# Patient Record
Sex: MALE | Race: WHITE | URBAN - METROPOLITAN AREA
[De-identification: names, ages, dates, MRNs, and addresses within clinical notes are randomized per-mention and may not be internally consistent; named-entity substitution may affect disease eponyms.]

---

## 2022-10-26 ENCOUNTER — HOSPITAL ENCOUNTER (OUTPATIENT)
Dept: BEHAVIORAL HEALTH | Facility: CLINIC | Age: 32
Discharge: HOME | End: 2022-10-26
Attending: FAMILY MEDICINE | Admitting: FAMILY MEDICINE
Payer: COMMERCIAL

## 2022-10-26 ENCOUNTER — BEH TREATMENT PLAN (OUTPATIENT)
Dept: BEHAVIORAL HEALTH | Facility: CLINIC | Age: 32
End: 2022-10-26
Attending: FAMILY MEDICINE

## 2022-10-26 PROCEDURE — 90791 PSYCH DIAGNOSTIC EVALUATION: CPT | Mod: GT,95

## 2022-10-26 NOTE — PROGRESS NOTES
"Anastacoi Middleton is a 32 year old male who is participating in an evaluation for problem gambling via a billable phone/video session.    Telemedicine Visit: The patient's condition can be safely assessed and treated via synchronous audio and visual telemedicine encounter.      Reason for Telemedicine Visit: Patient has requested telehealth visit    Originating Site (Patient Location): Patient's home    Distant Site (Provider Location): Provider Remote Setting- Home Office    Consent:  The patient/guardian has verbally consented to: the potential risks and benefits of telemedicine (video visit) versus in person care; bill my insurance or make self-payment for services provided; and responsibility for payment of non-covered services.     Mode of Communication:  Video Conference via [employer]    As the provider I attest to compliance with applicable laws and regulations related to telemedicine.    Phone visit start time:  12:30pm  Phone visit end time: 2:00pm    Length of session: 90 minutes    Counselor verified Patient with 2-point verification: Yes; Name  and     The patient has been notified of the following:      \"We have found that certain health care needs can be provided without the need for a face to face visit.  This service lets us provide the care you need with a phone conversation.       I will have full access to your [facility] medical record during this entire phone call.   I will be taking notes for your medical record.      Since this is like an office visit, we will bill your insurance company for this service.       There are potential benefits and risks of telephone visits (e.g. limits to patient confidentiality) that differ from in-person visits.?  Confidentiality still applies for telephone services, and nobody will record the visit.  It is important to be in a quiet, private space that is free of distractions (including cell phone or other devices) during the visit.??      If during the course of " "the call I believe a telephone visit is not appropriate, you will not be charged for this service\"     Consent has been obtained for this service by care team member: Yes     Informed of Duty to Warn?   Yes       Verbal consent for Release of Information:   Yes      Releases sent to patient through Docusign for signature?  Yes    Authorization For Electronic Communication  [place] - Self, Emergency Contact, DHS      Gambling Evaluation   Background Information     Date of Assessment:  10/26/2022   :  Jeni Schmitz [facility], [facility]   MRN:             [id number]   Patient Name:   Anastacio Middleton   YOB: 1990 Age:  32 year old Gender:  male   Preferred Name:  Anastacio   Pronouns:  He/him   Current Address:   [address] [place] [address]     Preferred phone #:  [phone]   May we leave a program related message?  Yes     Relationship Status   Ethnicity  White   Client's Primary Language:  English   E-mail address  [email or web address]   Referral Source:  [facility]     Emergency :  Lulu Kane   Emergency Contact Phone #:  [phone]     Do you have learning disabilities or require special accommodations?    No     What prompted you to come for a gambling assessment today?     Patient reported day trading stocks for two or more years. Patient reported lying to wife and hiding that he had been trading stocks. Patient reported \"unhealthy for my and my family\". Patient reported opening up to wife about day trading to wife about a week and a half ago and wife then called [facility] [place] and was referrals to [facility] for an assessment.      Have you been diagnosed with a gambling problem?    No     Have you been diagnosed with alcohol or drug related problems?    No           DIMENSION I - Acute Intoxication /Withdrawal Potential     Gambling History    Stage Age Games Played How Often Average Amt Bet Big Wins/Losses Consequences     Early   17 Stocks, day trading    12 x " week or more   $10,000-  $20,000 per week   $1,000 either wins or losses    at age 18, lost trust from wife. Lost money.     Middle     [age]   Stocks, day trading   10-20 trades per  week    $20,000-  $30,000 per week     $2,000- $3,000 either wins or losses   Again lost trust from wife, lost money. Beginning to be a problem.      Late     28-  Current   Stocks, day trading    12 x week or more   More than $30,000 per week   More than $3,000   Lying to wife. Mental wear and tear, lost money.      Last Bet:  About a month ago - trading stocks up to $250,000    Substance Use History             X = Primary Drug Used   Age of First Use Most Recent Pattern of Use and Duration   Need enough information to show pattern (both frequency and amounts) and to show tolerance for each chemical that has a diagnosis   Date of last use and time, if needed   Withdrawal Potential? Requiring special care Method of use  (oral, smoked, snort, IV, etc)      Alcohol     N/A             Marijuana/  Hashish   N/A           Cocaine/Crack     N/A           Meth/  Amphetamines   N/A           Heroin     N/A           Other Opiates/  Synthetics   N/A           Inhalants     N/A           Benzodiazepines     N/A           Hallucinogens     N/A           Barbiturates/  Sedatives/  Hypnotics N/A           Over-the-Counter Drugs   18 Tylenol, Advil - CU: 5 doses per week. Per patient does not completely take care of chronic hand pain. 10/25/2022  Oral      Other     N/A           Nicotine       12  Quit for 5 years  [facility]: 1 to 1 1/2-pack a day  [facility]: 1/2 pack a day 10/26/2022 Agitation  Smoked     Any current physical discomfort or withdrawal concerns?  Yes, please explain: chronic pain in left hand from injury in 2008 lost 2 fingers completely and 2 fingers partially.     Have you ever been to detox? No    How many times? NA    Have you had any of the following chemical dependency withdrawal symptoms?  Past 12 months Recent (past 30 days)    None None     Have you had any of the following gambling withdrawal symptoms?  Past 12 months Recent (past 30 days)   Sweating (Rapid Pulse)  Shaky / Jittery / Tremors  Unable to Sleep  Agitation  Headache  Fatigue / Extremely Tired  Sad / Depressed Feeling  Diminished Appetite  Anxiety / Worried Sweating (Rapid Pulse)  Shaky / Jittery / Tremors  Unable to Sleep  Agitation  Headache  Fatigue / Extremely Tired  Sad / Depressed Feeling  Diminished Appetite  Anxiety / Worried     Dimension I Ratings   Acute intoxication/Withdrawal potential - The placing authority must use the criteria in Dimension I to determine a client s acute intoxication and withdrawal potential.    RISK DESCRIPTIONS - Severity ratin Client displays full functioning with good ability to tolerate and cope with withdrawal discomfort. No signs or symptoms of intoxication or withdrawal or resolving signs or symptoms.    REASONS SEVERITY WAS ASSIGNED (What about the amount of the person s use and date of most recent use and history of withdrawal problems suggests the potential of withdrawal symptoms requiring professional assistance? )     Patient identifies gambling withdrawal symptoms, but reports ability to tolerate and cope with discomfort. Patient reports substance use with over the counter pain medication, reporting chronic pain in left hand from injury and smoking.            DIMENSION II - Biomedical Complications and Conditions     Do you have any current health/medical conditions?(Include any infectious diseases, allergies, or chronic or acute pain, history of chronic conditions)       Chronic pain since  - Left hand injury. Home accident  Not currently treated for pain only over the counter medication.     List current medication(s) including over-the-counter or herbal supplements--including pain management:     Tylenol and Advil (pain management) - five doses a week, most days but not every day.     Do you follow current medical  "recommendations/take medications as prescribed?     N/A    Do you have any dental problems?    No    Are you up to date on your medical, dental and eye appointments?     Yes    Are you or have you ever been prescribed: Abilify (Aripiprazole), Requip (ropinirole) Zelapar (selegiline hydrochloride), Comtan (entacapone) Mirapex (pramipexole)?     No    Do you have a health care provider?    The patient's PCP is Vicky Elizondo.    Do you need a referral to have a follow up with a primary care physician?    No.    Are you pregnant?     No    Do you have any concerns regarding your nutritional status?    No    Have you had any appetite changes in the last 3 months?    No    Have you had weight loss or weight gain of more than 10 lbs in the last 3 months?   If patient gained or lost more than 10 lbs, then refer to program RN / attending Physician for assessment.    No    Current height and weight?    6'1\" 205 lbs    Do you have any pain control problems?     Yes, and my current pain level on a 1 to 10 scale is a: 3. Tylenol and Advil do not take care of all the pain.     How is your pain managed?     Tylenol and Advil     Do you have any concerns/problems with short or long-term memory?     No    Dimension II Ratings   Biomedical Conditions and Complications - The placing authority must use the criteria in Dimension II to determine a client s biomedical conditions and complications.   RISK DESCRIPTIONS - Severity ratin Client tolerates and ga with physical discomfort and is able to get the services that the client needs.    REASONS SEVERITY WAS ASSIGNED (What physical/medical problems does this person have that would inhibit his or her ability to participate in treatment? What issues does he or she have that require assistance to address?)    Patient reports health conditions, reporting treatment from a primary care provider.  Patient denies biomedical conditions will not impact their ability " "to benefit form treatment. Patient reports their ability to navigate the health care system independently.             DIMENSION III - Emotional, Behavioral, Cognitive Conditions and Complications     The patient [place]      My childhood could be best described as:     \"Pretty normal, good childhood\"     Who raised you? (parents, grandparents, adoptive parents, step-parents, etc.)    Both Parents     Growing up, the patient was supported by:     Mom and brother     Siblings:     Two siblings brother and sister, patient is the youngest     Family Substance Use Disorder/Gambling/Mental Health history:     Extended family, uncle with gambling issue- Maternal, uncles with alcohol addictions - Maternal, another uncle drug addiction - Paternal. Grandfathers on both maternal and paternal sides with alcohol issues.        Have you ever been emotionally or verbally abused?            No    Have you ever emotionally or verbally abused someone else?        No    Have you ever been physically abused?            No    Have you ever physically abused someone else?            No    Have you ever intentionally hurt yourself by hitting, cutting or burning yourself?            No    Do you have any thoughts of harming anyone?            Yes, please explain: \"Passing thoughts, I think just with the gambling addiction passing thoughts of hurting wife, don't think I was close to doing those things, but I thought about it.\" patient reports no current thoughts.   Per follow up phone call with patient, patient reports thoughts as fleeting and having none since talking with her about his current gambling (stock trading). Patient reports no need for referral to speak to someone at this time.   Case consult with manager Mariela Durán on 10/27/2022. Advise patient to let us know if these thought occur again, but otherwise, no next steps needed.     Have you ever been sexually abused?            No    Have you ever sexually " "abused someone else?            No    Have you ever engaged in risky behavior that put you at risk for exposure to blood-borne or sexually transmitted diseases?    No    Have you ever been diagnosed with an eating disorder?          No    Have you ever been diagnosed with a clinical mental health disorder?            No    Have you ever been prescribed any medications for your mental health?            No    What medications are you currently taking?            None    Are you currently seeing a mental health therapist?            No    Have you ever had a suicide attempt?    No    Have you ever had any psychiatric hospitalizations?            No    Have you ever been in the ?    No    Highest grade of school completed:     College graduate - Bachelors Degree     Describe your preferred learning style:      by hands-on practice    Are you currently in school?            No    What are your greatest personal strengths?            Per patient \"I'm kind and care giving and a hard worker\".    What do you value most in life?            Per patient \"My alyssa in [employer]ab[employer] and family\".    GAIN Short Screener     1.)  When was the last time that you had significant problems...  A. with feeling very trapped, lonely, sad, blue, depressed or hopeless  about the future? Past Month    B. with sleep trouble, such as bad dreams, sleeping restlessly, or falling  asleep during the day? Past Month    C. with feeling very anxious, nervous, tense, scared, panicked, or like  something bad was going to happen? Past Month    D. with becoming very distressed and upset when something reminded  you of the past? 1+ years ago    E. with thinking about ending your life or committing suicide? Never    2.)  When was the last time that you did the following things two or more times?  A. Lied or conned to get things you wanted or to avoid having to do  something? Past Month    B. Had a hard time paying attention at school, work, or home? 2 - 12 " months ago    C. Had a hard time listening to instructions at school, work, or home? 2 - 12 months ago    D. Were a bully or threatened other people? 1+ years ago    E. Started physical fights with other people? 1+ years ago    Note: These questions are from the Global Appraisal of Individual Needs--Short Screener. Any item marked  past month  or  2 to 12 months ago  will be scored with a severity rating of at least 2.     For each item that has occurred in the past month or past year ask follow up questions to determine how often the person has felt this way or has the behavior occurred? How recently? How has it affected their daily living? And, whether they were using or in withdrawal at the time?    If the person has answered item 1E with  in the past year  or  the past month , ask about frequency and history of suicide in the family or someone close and whether they were under the influence.     NA    Has anyone close to you, a family member, a friend or a significant other attempted or completed a suicide?     No    If the person answered item 1E  in the past month  ask about intent, plan, means and access and any other follow-up information to determine imminent risk. Document any actions taken to intervene on any identified imminent risk.      NA    Dimension III Ratings   Emotional/Behavioral/Cognitive - The placing authority must use the criteria in Dimension III to determine a client s emotional, behavioral, and cognitive conditions and complications.   RISK DESCRIPTIONS - Severity ratin Client has impulse control and coping skills. Client presents a mild to moderate risk of harm to self or others or displays symptoms of emotional, behavioral or cognitive problems. Client has a mental health diagnosis and is stable. Client functions adequately in significant life areas.    REASONS SEVERITY WAS ASSIGNED - What current issues might with thinking, feelings or behavior pose barriers to participation in a  "treatment program? What coping skills or other assets does the person have to offset those issues? Are these problems that can be initially accommodated by a treatment provider? If not, what specialized skills or attributes must a provider have?    Patient denies a formal mental health diagnosis at this time and denies any mental health interventions in the past. Patient denies any current prescription for psychotropic medications at the time of assessment.  Patient's PHQ-9 was 6 out of 27, indicating mild depression.  Patient's LANA-7 score was 9 out of 21, indicating mild anxiety.  Patient denied any suicide attempts in the past.  Patient denied a history of trauma and/or abuse. Patient appears to lack impulse control and coping skills at this time.           DIMENSION IV - Readiness for Change     How has your gambling affected relationships in your life?     Per patient \"Strained and hurt relationship with wife\".    How has your gambling affected your finances?     Per patient \"Significantly hurt finances, loss of substantial amount of money\".    What values has gambling affected in your life?     Per patient \"Trustworthyness, openness with wife and general demoralizing to wife not coming to her for help and trying to fix a problem by myself\".    Has anyone expressed concern about your gambling?     Yes, please explain: Wife    What changes are you willing to make relative to your gambling?     Per patient \"I am willing to make a lot of changes, willing to give my wife control of all finances, not be involved with finances. Willing to stop gambling, willing to go to therapy or treatment to get help\".    How would you describe your current motivation to stop gambling?     Per patient \"Pretty motivatied\".      Dimension IV Ratings   Readiness for Change - The placing authority must use the criteria in Dimension IV to determine a client s readiness for change.   RISK DESCRIPTIONS - Severity ratin Client is " "cooperative, motivated, ready to change, admits problems, committed to change, and engaged in treatment as a responsible participant.    REASONS SEVERITY WAS ASSIGNED - (What information did the person provide that supports your assessment of his or her readiness to change? How aware is the person of problems caused by continued use? How willing is she or he to make changes? What does the person feel would be helpful? What has the person been able to do without help?)      Patient reports willingness to stop gambling, attend therapy and treatment. Patient describes being \"Pretty motivated\". Patient discussed awareness of gambling (stock trading) as a problem.        DIMENSION V - Relapse, Continued Use, and Continued Problem Potential     What triggers or situations increase your likelihood to steele?    Per patient \"Seeing big swings in the stock market and having time alone, hearing people talk about trading stocks, reading stuff on Internet and social media\".    What was your longest period of abstinence from gambling?    Four years - after Middle years age [age]    What did you do to stop gambling?    \"Talking with wife, making a plan that I would not do it. \"    What was your longest period of abstinence from alcohol/drugs?    Smoking - 6 years, both wife and patient quit together.     History of Gambling/Substance Use Disorder treatments  NA    If you had prior  or Gambling or Substance Use Disorder treatment, What was helpful?  What was not helpful?    NA    Please identify which self-help groups you have attended and how often you attended (Gamblers Anonymous, AA, NA, etc.)?    NA    What has helped you reduce your urges to steele?    \"Talking about the problem, not hiding it anymore.\"      Dimension V Ratings   Relapse/Continued Use/Continued problem potential - The placing authority must use the criteria in Dimension V to determine a client s relapse, continued use, and continued problem potential.   RISK " "DESCRIPTIONS - Severity ratin (A) Client has minimal recognition and understanding of relapse and recidivism issues and displays moderate vulnerability for further substance use or mental health problems. (B) Client has some coping skills inconsistently applied.    REASONS SEVERITY WAS ASSIGNED - (What information did the person provide that indicates his or her understanding of relapse issues? What about the person s experience indicates how prone he or she is to relapse? What coping skills does the person have that decrease relapse potential?)      Patient appears to understand history with gambling (stock trading) as an ongoing issue. Patient reports history of stock trading on and off since the age of 17 years old. With increase in amounts and time spent trading and thinking about trading.            DIMENSION VI - Recovery Environment   Are you currently working or in school? Please explain.     The patient reported he had been working full-time Remote two days per week, three days in office.      How has gambling affected your work?    N/A    How would you describe your current financial status?  Doing well \"not as good as we were\"    Are you are having problems with unpaid bills, bankruptcy, IRS problems, etc.?    No    What is your approximate present gambling debt?    \"Using money gambling out of savings\"    Describe a typical week for you i.e. work, leisure activities, socializing, etc.     Patient reports work 7am to 4pm M-F. 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 Hanging out with family, with friends once a week. Read books, watch movies, friends and family on Weekends.     Are you currently in a significant relationship?     Yes.  4B. How long? 13 years     Sexual Orientation:     Heterosexual    Who do you live with?      Wife and two kids two sons - 5 and 3 years old.     Do you have any children?      Yes, please explain: Two sons, 5 and 3 years old " "    How many times have you been ?    Once    Describe your current support system i.e. family, friends, sponsor, therapist, etc.?      Wife and brother.     Do you have any past or present legal charges?    Yes, please explain: past - legal charges  with accident to hand. These were dismissed.     Do you have any obstacles that would prevent you from participating in treatment?    In-patient would be tough but no issues with outpatient programs.     Do you pray, meditate, do yoga, attend AA/NA/GA or other spiritual practices?    Yes, please explain: Daily Prayer, attend [facility] a few times a month.     Do you participate in community alyssa activies such as attending [employer], temple, Anglican, or [employer]?      Yes    How does spirituality impact your recovery?    \"Was a major reason I started talking about it and admitting to the problem\"    Please list any other problems, issues or concerns that could affect your recovery if not addressed?      \"Maybe have processing to do from the accident I was in, and still dealing with the chronic pain\"    Dimension VI Ratings   Recovery environment - The placing authority must use the criteria in Dimension VI to determine a client s recovery environment.   RISK DESCRIPTIONS - Severity ratin Client has passive social  or family and significant other are not interested in the client's recovery. The client is engaged in structured meaningful activity.    REASONS SEVERITY WAS ASSIGNED - (What support does the person have for making changes? What structure/stability does the person have in his or her daily life that will increase the likelihood that changes can be sustained? What problems exist in the person s environment that will jeopardize getting/staying clean and sober?)     The patient reports living home at the time of assessment.  Patient reports living environment is supportive of recovery efforts.  Patient reports being  for 13 years. " "Patient reports brother and parents know about stock trading but do not think it's a problem. The patient appears to lack adequate support in the community through 12-step meetings or other recovery based interactions at this time and appears to lack additional supports familiar with recovery.  Patient is employed full time. Patient denies any current legal involvement at this time.                     Summary of Gambling Disorder Symptoms     Needs to steele with increasing amount of money in order to achieve desired excitement.  Is restlessness or irritable when attempting to cut down or stop gambling.  Has made repeated unsuccessful efforts to cut down or stop gambling.  Is often preoccupied with gambling (e.g. having persistent thoughts of reliving past gambling experiences, handicapping or planning the next venture, thinking of ways to get money with which to steele).  Often gambles when feeling distressed (e.g. feelings of helplessness, guilt, anxiety, depression).  After losing money gambling, individual often returned another day to get even. (\"chasing one's losses\")  Lies to conceal the extent of involvement with gambling.  Has jeopardized or lost a significant relationship, job, educational or career opportunity because of gambling.    Specify if:   Episodic:  Meeting diagnostic at more than one time point, with symptoms subsiding between periods of gambling disorder for at least several months.    Persistent:  Experiencing continuous symptoms, to meet diagnostic criteria for multiple years.    Specify if:   In early remission:  After full criteria for alcohol/drug use disorder were previously met, none of the criteria for alcohol/drug use disorder have been met for at least 3 months but for less than 12 months (with the exception that Criterion A4,  Craving or a strong desire or urge to use alcohol/drug  may be met).     In sustained remission:   After full criteria for alcohol use disorder were " previously met, none of the criteria for alcohol/drug use disorder have been met at any time during a period of 12 months or longer (with the exception that Criterion A4,  Craving or strong desire or urge to use alcohol/drug  may be met).   Specify if:   This additional specifier is used if the individual is in an environment where access to alcohol is restricted.    Mild: Presence of 4-5 symptoms    Moderate: Presence of 6-7 symptoms    Severe: Presence of 8 or more symptoms      Summary of Substance Abuse Disorder Symptoms     A problematic pattern of alcohol/drug use leading to clinically significant impairment or distress, as manifested by at least two of the following, occurring within a 12-month period:    NA    Specify if:   In early remission:  After full criteria for alcohol/drug use disorder were previously met, none of the criteria for alcohol/drug use disorder have been met for at least 3 months but for less than 12 months (with the exception that Criterion A4,  Craving or a strong desire or urge to use alcohol/drug  may be met).     In sustained remission:   After full criteria for alcohol use disorder were previously met, none of the criteria for alcohol/drug use disorder have been met at any time during a period of 12 months or longer (with the exception that Criterion A4,  Craving or strong desire or urge to use alcohol/drug  may be met).   Specify if:   This additional specifier is used if the individual is in an environment where access to alcohol is restricted.    Mild: Presence of 2-3 symptoms    Moderate: Presence of 4-5 symptoms    Severe: Presence of 6 or more symptoms    SOGS: 11 DSM-5: 8 CAGE-AID: 0 LANA-7: 9 PHQ-9: 6       Vulnerable Adult Checklist for OUTPATIENTS     1.  Do you have a physical, emotional or mental infirmity or dysfunction?       No    2.  Does this issue impair your ability to provide for your own care without help, including providing yourself with food, shelter,  clothing, healthcare or supervision?       No    3.  Because of this issue, I need assistance to protect myself from maltreatment by others.      No    Based on the above information:    This person is not a functional Vulnerable Adult according to [place] Statute 626.5572 subdivision 21.      Category Severity (ICD-10 Code / DSM 5 Code)   Gambling Disorder Severe  (F63.0) (312.31)     [place]-Suicide Severity Rating Scale   Suicide Ideation   1.) Have you ever wished you were dead or that you could go to sleep and not wake up?     Lifetime:  Yes Past Month:  Yes   2.) Have you actually had any thoughts of killing yourself?   Lifetime:  Yes Past Month:  Yes   3.) Have you been thinking about how you might do this?     Lifetime:  No Past Month:  No   4.) Have you had these thoughts and had some intention of acting on them?     Lifetime:  No Past Month:  No   5.) Have you started to work out the details of how to kill yourself?   Lifetime:  No Past Month:  No   6.) Do you intend to carry out this plan?      Lifetime:  No Past Month:  No   Intensity of Ideation   Intensity of ideation (1 being least severe, 5 being most severe):     Lifetime:  1 Past Month:  1   How often do you have these thoughts?  Once a week      When you have the thoughts how long do they last?  Fleeting - few seconds or minutes   Can you stop thinking about killing yourself or wanting to die if you want to?  Yes, can control thoughts with some difficulty   Are there things - anyone or anything (i.e. family, Zoroastrian, pain of death) that stopped you from wanting to die or acting on thoughts of suicide?  Protective factors definitely stopped you from attempting suicide   What sort of reasons did you have for thinking about wanting to die or killing yourself (ie end pain, stop how you were feeling, get [id number] or reaction, revenge)?  Completely to end or stop the pain (you couldn't go on living the way you were feeling)   Suicidal Behavior    (Suicide Attempt) - Have you made a suicide attempt?     Lifetime:  No Past Month:  No   Have you engaged in self-harm (non-suicidal self-injury)?  No   (Interrupted Attempt) - Has there been a time when you started to do something to end your life but someone or something stopped you before you actually did anything?  NA   (Aborted or Self-Interrupted Attempt) - Has there been a time when you started to do something to try to end your life but you stopped yourself before you actually did anything?  NA   (Preparatory Acts of Behavior) - Have you taken any steps towards making suicide attempt or preparing to kill yourself (such as collecting pills, getting a gun, giving valuables away or writing a suicide note)?  NA   Actual Lethality/Medical Damage:  NA     2008  [employer] [facility]  Used with permission by Letty Marques, PhD.       Guide to C-SSRS Risk Ratings   NO IDEATION:  with no active thoughts IDEATION: with a wish to die. IDEATION: with active thoughts. Risk Ratings   If Yes No No 0 - Very Low Risk   If NA Yes No 1 - Low Risk   If NA Yes Yes 2 - Low/moderate risk   IDEATION: associated thoughts of methods without intent or plan INTENT: Intent to follow through on suicide PLAN: Plan to follow through on suicide Risk Ratings cont...   If Yes No No 3 - Moderate Risk   If Yes Yes No 4 - High Risk   If Yes Yes Yes 5 - High Risk   The patient's ADDITIONAL RISK FACTORS and lack of PROTECTIVE FACTORS may increase their overall suicide risk ratings.     Additional Risk Factors:    Significant history of untreated or poorly treated chronic pain issues     A recent death of someone close to the patient and/or unresolved grief and loss issues   Protective Factors:    Having people in his/her life that would prevent the patient from considering a suicide attempt (i.e. young children, spouse, parents, etc.)     A positive relationship with his/her clinical medical and/or mental health  "providers     Having easy access to supportive family members     Having a good community support network     Having cultural, Pentecostal or spiritual beliefs that discourage suicide     Risk Status   2. - Low/moderate risk: Collaborate with patient / client to develop \"Patient Safety Plan\"   Additional information to support suicide risk rating: There was no additional information to provide at this time.     Summary of [facility] Placement Criteria:   I.) Intoxication and Withdrawal: 0   II.) Biomedical:  1   III.) Emotional and Behavioral:  1   IV.) Readiness to Change:  0   V.) Relapse Potential: 2   VI.) Recovery Environmental: 1     Evaluation Summary and Plan   's Recommendation    Abstain from all forms of gambling, including \"free\" games , and online/héctor games.  Abstain from all mood-altering chemicals unless prescribed by a licensed provider.  Complete the evening outpatient compulsive gambling treatment program at [facility].   Complete Orientation and begin group on 10/27/2022.  Attend GA 12-step, cultural, spiritual, other supportive community meeting on a weekly basis.   Have someone you check in with weekly who will hold you accountable.   Remain law abiding and follow all recommendations of the courts/PO.  Limit, if not abstain from alcohol and all other non-prescribed mood altering chemicals    At the beginning of the assessment patient was offered information on inpatient treatment, other outpatient options, individual counselors, and Gamblers Anonymous.  Patient chose to attend [facility] as it worked for their schedule and has a family component.   has a financial interest in patient attending the [facility] program.  's clinical opinion is that the patient would benefit from group therapy.      Initial problem list:    The patient lacks relapse prevention skills  The patient has poor coping skills  The patient lacks a sober peer support network    Strengths:  Family " support  Stable Employment  Financially secure  Properly treated mental health concerns  Properly treated physical health concerns  Stable housing  Connection to social and/or recovery support  Intelligent  Functional communication skills    Writer called patient and recommended outpatient counseling with [facility]. Groups Tuesdays and Wednesdays 5:30pm-7:30pm and individual sessions as needed. Patient accepted recommendation and will be starting group on 10/27/2022 with orientation at 2:00pm also on 10/27/2022.     Jeni BRIZUELA, CGC

## 2022-10-27 ENCOUNTER — HOSPITAL ENCOUNTER (OUTPATIENT)
Dept: BEHAVIORAL HEALTH | Facility: CLINIC | Age: 32
Discharge: HOME | End: 2022-10-27
Attending: FAMILY MEDICINE
Payer: COMMERCIAL

## 2022-10-27 ENCOUNTER — TELEPHONE (OUTPATIENT)
Dept: BEHAVIORAL HEALTH | Facility: CLINIC | Age: 32
End: 2022-10-27

## 2022-10-27 PROCEDURE — 90832 PSYTX W PT 30 MINUTES: CPT | Mod: GT,95

## 2022-10-27 PROCEDURE — 90853 GROUP PSYCHOTHERAPY: CPT | Mod: GT,95

## 2022-10-27 NOTE — ADDENDUM NOTE
Encounter addended by: Jeni Schmitz [facility] on: 10/27/2022 1:32 PM   Actions taken: Clinical Note Signed

## 2022-10-27 NOTE — PROGRESS NOTES
"Adult Outpatient Mental Health Programs    MY COPING PLAN FOR SAFETY    NAME: Anastacio Middleton  MRN: [id number]  SAFETY PLAN:  Step 1: Warning signs / cues (Thoughts, images, mood, situation, behavior) that a crisis may be developing:    Thoughts: \"I'm a burden\"    Images: obsessive thoughts of death or dying: \    Thinking Processes: ruminations (can't stop thinking about my problems): .    Mood: intense worry and agitation    Behaviors: isolating/withdrawing , not taking care of myself and increasing frequency and duration of dissociation    Situations: public shame: . and relationship problems   Step 2: Coping strategies - Things I can do to take my mind off of my problems without contacting another person (relaxation technique, physical activity):    Distress Tolerance Strategies:  relaxation activities: ., listen to positive and upbeat music: ., watch a funny movie: ., read a book: ., paced breathing/progressive muscle relaxation and intense exercise: . for 2-3 minutes     Physical Activities: go for a walk, exercise: ., yoga, deep breathing and stretching     Focus on helpful thoughts:  \"I will get through this\", think about happy memories: family vacations, [facility]. Family cuddle time. Family holiday times. , remind myself of what is important to me: alyssa, relationships with my wife and kids. and self-compassion statements: you are a good spouse and father, you are a beliver and have alyssa, you are a good human being.   Step 3: People and social settings that provide distraction:   Name: Lulu  Phone: [phone]   Name: Brother Khari Slaughter Phone: [phone]   Name: Cousin Khari Guardado  Phone: [phone]    movie theater, coffee shop, park and [employer]   Step 4: Remind myself of people and things that are important to me and worth living for:  Wife and kids, immediate family, friends   Step 5: When I am in crisis, I can ask these people to help me use my safety plan:   Name: Lulu Phone: [phone]   Name: " Brother Khari Slaughter Phone: [phone]   Name: Irina Chandra Phone: [phone]  Step 6: Making the environment safe:     remove access to firearms: ., remove things I could use to hurt myself: . and be around others  Step 7: Professionals or agencies I can contact during a crisis:    Suicide Prevention Lifeline: 5-614-217-TALK (0634)    Crisis Text Line Service (available 24 hours a day, 7 days a week): Text [place] to 483339    Call  **CRISIS (069409) from a cell phone to talk to a team of professionals who can help you.  Crisis Services By [facility]: Phone Number:   [place]     [phone]   [place]    [phone]   [place]    [phone]   [place]    [phone]   [place]    [phone]   [place] [phone]   [place]     [phone]       Call 911 or go to my nearest emergency department.     I helped develop this safety plan and agree to use it when needed.  I have been given a copy of this plan.      Client signature _________________________________________________________________  Today s date:  10/27/2022  Adapted from Safety Plan Template 2008 Silvia Medina and Fortunato Carrion is reprinted with the express permission of the authors.  No portion of the Safety Plan Template may be reproduced without the express, written permission.  You can contact the authors at [email or web address] or [email or web address].    Email to patient with copy of safety plan    Jeni BRIZUELA, [facility]

## 2022-10-27 NOTE — TELEPHONE ENCOUNTER
Emailed DocuSign forms to patient to sign, date and time.   Release of information forms for DHS, Self, Emergency Contact and consent for electronic communication.

## 2022-10-27 NOTE — PROGRESS NOTES
"Individual counseling session:    Telemedicine Visit: The patient's condition can be safely assessed and treated via synchronous audio and visual telemedicine encounter.      Reason for Telemedicine Visit: Patient has requested telehealth visit    Originating Site (Patient Location): Patient's place of employment [address] [place] [address]    Distant Site (Provider Location): Provider Remote Setting- Home Office    Consent:  The patient/guardian has verbally consented to: the potential risks and benefits of telemedicine (video visit) versus in person care; bill my insurance or make self-payment for services provided; and responsibility for payment of non-covered services.     Mode of Communication:  Video Conference via [employer]    As the provider I attest to compliance with applicable laws and regulations related to telemedicine.    Counselor verified Patient with 2-point verification: Patient is known to provider.    Video visit start time: 2:00pm  Video visit end time: 2:35pm    Length of session: 35 minutes    D: Writer and patient met for scheduled individual session for Orientation to program and to set up Safety Plan. Patient was virtual from work at above address. Writer went over orientation information and paperwork with patient. Writer and patient put together Safety Plan. Patient asked questions about having face to face individual sessions. Writer informed patient this is an option. Writer also let patient know we have a hybrid group for in-person and virtual patients on the first Thursday of the month.     Patient identified goals as:   Rebuilding family relationships    Learning coping skills so not to relapse    Feeling better emotionally from what s happened, feeling more like myself\"    I: Counselor facilitated 1:1 session.  A: Patient appeared motivated to start treatment.   P: Writer to email copy of Safety plan to patient and welcome email. Patient to attend first group session " tonight at 5:30pm via Zoom.       Jeni BRIZUELA, CGC

## 2022-10-27 NOTE — ADDENDUM NOTE
Encounter addended by: Jeni Schmitz [facility] on: 10/27/2022 4:08 PM   Actions taken: Clinical Note Signed

## 2022-10-28 NOTE — PROGRESS NOTES
Group Therapy Documentation     Was the patient seen in-person or via Telemedicine (if in-person skip to Group Note): Telemedicine    If Telemedicine: The patient's condition can be safely assessed and treated via synchronous audio and visual telemedicine encounter. ? ?      Reason for Telemedicine Visit: Patient has requested telehealth visit    Originating Site (Patient Location): Patient's home    Distant Site (Provider Location): Provider Remote Setting- Home Office    Consent: ?The patient/guardian has verbally consented to: the potential risks and benefits of telemedicine (video visit) versus in person care; bill my insurance or make self-payment for services provided; and responsibility for payment of non-covered services.       Mode of Communication:??Video Conference via Zoom      As the provider I attest to compliance with applicable laws and regulations related to telemedicine.        Patient attended a video group session on the following days: ?          GROUP NOTE:  DATE OF SERVICE:  October 27, 2022    START TIME:  5:30pm    END TIME:  7:16pm    Group Length:   106 minutes    FACILITATOR(S):    Jeni BRIZUELA CGC    TOPIC: BEH Group Therapy, Problem Gambling Group     Number of patients attending the group: 4    Group Therapy Type:  Problem Gambling Group    Group Attendance:  Client attended group     Summary of Group / Topics Discussed:   Group began with the introduction of a new group member. Each group member took turns with their introduction to new member. The rest of the group session was an open conversation about recovery and treatment. Each group member added to discussion and had questions for other members. Topics discussed were: mental health therapy, Gamban, [employer], honesty, getting started in recovery, GA and others.      Patient's response to the group topic/interactions:    Patient appeared to connect well with others in the group.       Client specific details:     Patient was  introduced to group as this was his first group session. Patient asked questions of others and shared information about his current and past struggle with addiction.       Jeni Schmitz LADC, [facility]

## 2022-11-01 ENCOUNTER — HOSPITAL ENCOUNTER (OUTPATIENT)
Dept: BEHAVIORAL HEALTH | Facility: CLINIC | Age: 32
Discharge: HOME | End: 2022-11-01
Attending: FAMILY MEDICINE
Payer: COMMERCIAL

## 2022-11-01 PROCEDURE — 90853 GROUP PSYCHOTHERAPY: CPT | Mod: GT,95

## 2022-11-02 ENCOUNTER — TELEPHONE (OUTPATIENT)
Dept: BEHAVIORAL HEALTH | Facility: CLINIC | Age: 32
End: 2022-11-02

## 2022-11-02 NOTE — PROGRESS NOTES
"Group Therapy Documentation     Was the patient seen in-person or via Telemedicine (if in-person skip to Group Note): Telemedicine    If Telemedicine: The patient's condition can be safely assessed and treated via synchronous audio and visual telemedicine encounter. ? ?      Reason for Telemedicine Visit: Patient has requested telehealth visit    Originating Site (Patient Location): Patient's home    Distant Site (Provider Location): Provider Remote Setting- Home Office    Consent: ?The patient/guardian has verbally consented to: the potential risks and benefits of telemedicine (video visit) versus in person care; bill my insurance or make self-payment for services provided; and responsibility for payment of non-covered services.       Mode of Communication:??Video Conference via Zoom      As the provider I attest to compliance with applicable laws and regulations related to telemedicine.        Patient attended a video group session on the following days: ?          GROUP NOTE:  DATE OF SERVICE:  November 1, 2022    START TIME:  5:30pm    END TIME:  7:20 PM    Group Length:   110 minutes    FACILITATOR(S):    CHATA Perkins, [facility]    TOPIC: BEH Group Therapy, Problem Gambling Group     Number of patients attending the group: 6    Group Therapy Type:  Problem Gambling Group    Group Attendance:  Client attended group     Summary of Group / Topics Discussed: New group member was introduced and group members participated in introductions welcoming new group member.  Group members checked in sharing events from the week as well as a feeling.  Group discussion was held on various distorted thinking styles. Counselor shared diagram of \"The addiction cycle\".  Group members participated, sharing their personal experience with distorted thinking.  Group discussed various ways to challenge distorted thinking patterns to develop new patterns in recovery.     Patient's response to the group topic/interactions:  " PT's appeared to gain insight into their own distorted thinking patterns.      Client specific details:  PT shared feeling some overwhelming feelings navigating aspects of new recovery and other obligations.  PT actively engaged and shared in group discussion sharing group as a positive experience.    Clotilde Fox, CHATA, CGC

## 2022-11-03 ENCOUNTER — HOSPITAL ENCOUNTER (OUTPATIENT)
Dept: BEHAVIORAL HEALTH | Facility: CLINIC | Age: 32
Discharge: HOME | End: 2022-11-03
Attending: FAMILY MEDICINE
Payer: COMMERCIAL

## 2022-11-03 PROCEDURE — 90832 PSYTX W PT 30 MINUTES: CPT

## 2022-11-03 PROCEDURE — 90853 GROUP PSYCHOTHERAPY: CPT | Mod: GT,95

## 2022-11-04 ENCOUNTER — TELEPHONE (OUTPATIENT)
Dept: BEHAVIORAL HEALTH | Facility: CLINIC | Age: 32
End: 2022-11-04

## 2022-11-04 NOTE — ADDENDUM NOTE
Encounter addended by: Jeni Schmitz [facility] on: 11/4/2022 9:46 AM   Actions taken: Clinical Note Signed

## 2022-11-04 NOTE — PROGRESS NOTES
[facility]  Adult Gambling Program  Treatment Plan Requirements    These services are provided by the facility for each patient/client according to the individual's treatment plan:    Individual and group counseling    Education    Transition services    Services to address any co-occurring mental illness    Service coordination    Initial Treatment Plan Goals if noted in plan:  1. Complete all the requirements of Program Orientation.  2. Maintain medication compliance throughout the program.  3. Complete gambling therapy for identified issues on your problem list.  4. Gain family involvement in treatment process to address family issues from the problem list.  5. Attend and participate in all required group(s) per individual treatment plan.  6. Focus attention to individualized issues from the treatment plan.  7. Schedule a physical examination if recommended.    In addition to the above, complete all individual goals as specifically outlines on your treatment plan.    Criteria for discharge:  Patients/clients are discharged from the program following completion of the entire program including Phase I and II or acceptance of other post-treatment referrals to mental health providers, or aftercare at other facilities.  Patients/clients may also be discharged for inappropriate behavior, chemical use or gambling.      Favorable Discharge - Patients/clients have completed agreed upon treatment goals, understand their diagnosis and appear motivated about the follow-up care.    Guarded Discharge - Patients/clients have demonstrated some understanding of their diagnosis and recovery process and have completed some of their treatment goals.  This prognosis also includes patients/clients who have completed some treatment goals but have not made commitment to community support or follow through with referrals.    Unfavorable Discharge - Patients/clients have not completed agreed upon treatment  "goals due to their own choice, have limited understanding of their diagnosis, and have shown minimal or inconsistent behavior conducive to recovery.  Those patients/clients discharged due to behavioral problems will also be unfavorable discharges.                                  Adult Gambling Treatment Plan     Anastacio Middleton       [id number]  1990       32 year old       male                                   Treatment Plan Goals     PT identified treatment goals as:    1.\"Rebuilding family relationships\"  2.\"Learning coping skills so not to relapse\"  3.\"Feeling better emotionally from what's happened, feeling more like myself\"    Acute Intoxication/Withdrawal Potential      DIMENSION 1  RISK FACTOR: 0       Date  Source  Prob/Goal/  Intervention  Target  Date  Initials  Outcome  Completion  Date    November 3, 2022     Self - Current,   and Assessment - Current   Problem:   -PT reports last gambling behavior in Sept 2022.      Goal:   -Limit, if not abstain, from alcohol, and abstain from all other mood altering chemicals due to history of addiction and/or concern of cross addiction.    -Remain gambling free.  \"Been alcohol free entire life, abstain from other mood altering chemicals and stop smoking \"    Intervention:   -Report to counselor any concern or abuse with alcohol, drug use, or gambling during treatment.     -PT denies any gambling behaviors                                     Ongoing        Ongoing         AD /  JNM                                                                     Eff                                       04/12/2023     Biomedical Conditions and Complaints      DIMENSION 2  RISK FACTOR:   1       Date Source  Prob/Goal/  Intervention  Target  Date  Initials  Outcome  Completion  Date   November 3, 2022 Self - Current, and Assessment - Current  Problem:   -Patient reports a medical issue of chronic pain from hand injury in 2008    Goal:  -Follow recommendations of medical " "provider.  \" Follow doctor recommendations \"    Intervention:  -Keep and attend scheduled medical appointments and follow recommendations  .  -Take medications as prescribed      Yearly Physical                      Ongoing      As prescribed    Refered AD /  JNM                           None at this time.     November 3, 2022 Self - Current, and Assessment - Current   Problem:   -Poor exercise, diet, and sleep habits.     Goal:   -Improve physical and psychological health.   \"Get into a consistent exercise routine and michelle a mental health therapist \"    Interventions:   -Start an exercise program with regards to your ability, eat 3 nutritional meals daily and get 6-8 hours of sleep nightly.     -Gain an updated physical    Group:  Educational lecture/group discussion on Post Acute Withdrawal (PAW).    Video:  Sleep Hygiene                        Ongoing        Ongoing      In group        In group    AD /  JNM                     Eff        See above    Eff        Eff                     04/12/2023 11/17/2022 01/31/2023 11/17/2022     Emotional/Behavioral/Cognitive Conditions and Complications     DIMENSION 3  RISK FACTOR: 1            Date Source Prob/Goal/  Intervention Target  Date Initials Outcome Completion  Date   November 3, 2022 Self - Current, and Assessment - Current   Problem:   -Patient lacks impulse control and understanding of the effects of compulsive gambling on emotions and thinking patterns.    Goal:   -To understand the effects of compulsive gambling on your emotions.  -Learn to self soothe and calm any racing thoughts.  -Strengthen rational thought patterns.  \" Develop coping mechanisms and learn meditation and other body calming techniques \"    Intervention:  -Utilize emotions list, and begin journaling daily and share progress with counseling staff.    Group Discussion:  Discuss emotional effects of fears that impact recovery and how self-sabotaging behaviors impact " "recovery.    Group Discussion:  Levels of anger and ways of resolution     Grief and Loss          Group Discussion:      Taking Daily Inventory    Coping Skills:  99 Coping Skills     Discussion:  A Guide to Managing Stress    Video  Monkey Mind - Anxiety, Anger, Depression explained     Discussion:  Different emotion lists and Feelings Wheel    Behavior Model/Beliefs/Belief Window    Suicide - Ways to get help and rates with compulsive gambling.     Video:  \"Happiness is all in your mind\"                                   Ongoing          In group          In group    In group                  In group      In group      In group      In group       In group     In group     In group     In group     In group  AD /  SHANTELL                                                                     Eff          Eff    Eff                    Eff    Eff      Eff      Eff      Eff    Eff    Eff    Eff    Eff                                             11/8/2022          02/02/2023    02/07/2023                    11/01/2022    11/15/2022      12/15/2022      12/20/2022      12/05/2023    12/05/2023    01/24/2023    03/21/2023    01/19/2023   November 3, 2022 Self - Current, and Assessment - Current   Problem:   -Patient's self-esteem has been negatively impacted due to gambling and subsequent consequences.    Goal:   -To clarify self-concept.  Recognize how this has affected behaviors and how it can affect recovery and build self-esteem and self-worth.  \" To become more connected with self, learn to accept what I have done \"    Intervention:  Assignment:  -Explore the Real you, Ch. 21,  Who are you Ch. 22                                     In group                 CASSIE /  SHANTELL                                                         Eff                               01/26/2023            November 3, 2022 Self - Current Assessment - Current   Problem:    -Patient reports no mental health diagnosis at this time     Goal:  -Continue " "to stabilize mental health.   -Develop awareness of how mental health issues affect gambling and vice versa.    -Make preparations for individual therapy  \" Get a mental health therapist and build better mental habits \"  Intervention:     -Complete safety plan    -Update safety plan as needed    -Gain a mental health therapist in the community for ongoing support.                                Intake    As needed    Ongoing       AD /  JNM                                             Eff        Eff   11/03/2022                        10/27/2022        04/11/2023   November 3, 2022 Self - Current, and Assessment - Current   Problem:   -Patient struggles to have open and clear communication with others.    Goal:    -Increase understanding of clear communication styles.  \" Work to improve personal communication with family to be open and more vulnerable, go to wife when help is needed.\"                                              AD /  JNM                             Readiness to Change     DIMENSION 4  RISK FACTOR:   0            Date Source Prob/Goal/  Intervention Target  Date Initials Outcome Completion  Date   November 3, 2022 Self - Current, Assessment - Current   Problem:   -Patient diagnosed with Disordered Gambling (DSM-V).  PT has a combination of both internal and external motivation.    Goal:    -To refrain from gambling, increase knowledge of problem gambling, and to gain skills to help remain gambling free.  \" Abstain from gambling, learn techniques to avoid situations of gambling. \"     Intervention:  -24 Sessions of Phase I group (T/W/TH: 5:30 - 7:30pm)  -20 Sessions of Phase II group (T/TH: 5:30 - 7:30pm)  -Additional counseling sessions as deemed beneficial  -Phase III-1 night per week of group      Assignment:    Group Discussion:  Progression of Change and Stages of Change       Weekly:   Weekly verbal check in                             Phase I    Phase II    Ongoing    Phase III            In " "group           Weekly     AD /  MARENM                                             Eff    Eff    Eff    Eff            Eff          Eff                           01/24/2023    03/07/2023    04/12/2023    04/11/2023            12/01/2022          04/11/2023   November 3, 2022 Self - Current, and Assessment - Current   Problem:   -Patient lack of understanding the illness of compulsive gambling and life areas impacted.     Goal:   -To understand and gain awareness of the disease of compulsive gambling as a chronic, progressive incurable (but treatable) disease and to examine all areas affected.   -Increase motivation  \" Learn more about gambling and ways it affects you mind and others around you \"    Intervention:  Handout:  -Gambling and the Brain       Worksheet \"Get Motivated\"    Video: How I Tricked my Brain to Like Doing Hard Things (Dopamine Detox)    S.M.A.R.T Goals    Busyness                                   Orientation Folder    In group    In group       In group     In group  AD /  MARENM                                                         Eff    Eff      Eff    Eff                                         02/14/2023    02/14/2023      02/16/2023    03/02/2023            November 3, 2022 Self - Current, and Assessment - Current   Problem:    -Patient is disconnected from their Humanity, values, and virtues.    Goal:    -Decrease negative self-talk, increase sense of purpose in life and their value to society.  \" Improve self esteem and examine life goals and family goals\"    Intervention:    Group Discussion:  -Morals, values and ethics.  Practicing values in recovery.      Group Discussion:  -Practicing gratitude    Self Compassion     Group:  -Positive reading/Mindfulness reading    Character Strengths  Worksheet Strength and Qualities                             In group          In group    In group      Weekly    In group   In group  AD /  JNM                   " "                            Eff          Eff    Eff      Eff    Eff  Eff                             01/10/2023          12/27/2022    12/06/2022      04/11/2023    02/21/2023  02/23/2023       Relapse/Continued Use/Continued Problem Potential     DIMENSION 5  RISK FACTOR:   2               Date Source Prob/Goal/  Intervention Target  Date Initials Outcome Completion  Date   November 3, 2022 Self - Current, and Assessment - Current   Problem:    -Patient has poor relationship with money, large amounts of money gambled, and financial stressors because of gambling.    Goal:   -Remove all access to money and involvement in financial matters, protect assets.  reduce financial stress and gain insight into money relationship  \" Put in financial controls and give money control to my spouse and avoid making any financial decisions by myself \"    Intervention:  -Set up financial safeguards, such as a trusted person, bank, and/or other financial institution such as [employer].    Assignment:    -Chapter 16:  Emotional Meaning of Money    -Chapter 17:  Money and Finances    Checking your Credit Report - Difference between credit reports and credit score.          Gain a financial counselor and set up household budget                                 Ongoing            In group    In group    In group            Ongoing AD /  JNM                                                             Eff    Eff    Eff            Eff                                               01/17/2023    01/19/2023  04/11/2023  03/07/2023            02/14/2023   November 3, 2022 Self - Current, and Assessment - Current   Problem:    -Patient lacks an understanding of relapse triggers and cues and personal relapse process    Goal:    -Gain an understanding of events, places, and other variables can trigger a relapse.  \" Learn about my personal triggers and potential relapse signs and make a plan of what to do when they arise.  \"    Intervention:  -Ban self " "from [employer] establishments - omit from mailing lists.        Assignment:  -Trigger Inventory    -Early Warning Signs of Relapse      Group Discussion:  -Cross addiction, feelings in early recovery and urges to drink/steele or other impulsive behaviors.    Group Discussion:    Diagram of \"The addiction cycle\".       Stressors and stress management     Information and discussion on Gamblers Anonymous and SMART Recovery    Video:  \"Post Secrets\"   The Addiction Machine                               Ongoing            In group    In group        In group            In group      In group    In group        In group   In group  AD /  JNM                                                     Eff    Eff        Eff            Eff      Eff    Eff        Eff  Eff                                               11/29/2022    12/29/2022  01/03/2023      03/21/2023            11/01/2022      12/27/2022    03/14/2023        01/10/2023  02/23/2023         Recovery Environment     DIMENSION 6  RISK FACTOR:    1                Date Source Prob/Goal/  Intervention Target  Date Initials Outcome Completion  Date   November 3, 2022 Self - Current, and Assessment - Current   Problem:   -Patient lacks a sense of purpose and finding balance in life.    Goal:   -Develop a sense of purpose in life, and finding purpose, meaning and balance in daily events  \"Define purpose and goals in life and make steps to achieving those goals. \"    Intervention:  Maintain active employment    Assignment:    Group Discussion:  -Alternatives to gambling - finding leisure activies supportive of recovery \"Leisure and Balance Wheel\".    Discussion/Video:  15 Ways to Reduce Screen Time    Geocaching                             Ongoing          In group          In group    In group   AD /  SHANTELL                                       Eff          Eff          Eff    Eff                         04/11/2023          12/15/2022          12/13/2022    02/21/2023 " "    November 3, 2022 Self - Current, and Assessment - Current   Problem:   -Lack of a sustained relationship with a higher power of your understanding and/or cultural supports.    Goal:   -Develop and nurture relationship with a higher power of your understanding and integrate personal beliefs and traditions to serve as a framework for healing in treatment.  \" Continue to go to [employer] and make life decisions biased on morality \"    Intervention  -Patient to search out and join a culturally specific group, team, spiritual center or social event that is help at least monthly to increase support for long term recovery                               Ongoing         AD /  SHANTELL                 November 3, 2022 Self - Current, and Assessment - Current   Problem:   -Patient lacks a strong sober support system and connection with others.    Goal:   -Develop a strong network of people in recovery  \" Have a network of people that I can lean on within this treatment group and other family members as support \"    Intervention:  Group work:        -Identify GA/support meetings you can attend    Video:  \"Everything You Know About Addiction is Wrong\"     Speaker:        -GA Member Speaker    Altruism                                   In group      In group            In group    In group        CASSIE /  SHANTELL                                                 Eff      Eff            Eff    Eff                               12/06/2022      11/10/2022            12/08/2022  01/12/2023  01/12/2023              November 3, 2022 Self - Current, and Assessment - Current   Problem:   -Relationships with family/concerned persons have been strained through use and related behaviors    Goal:    -Begin healing damaged relationships.  Allow adequate time for healing to occur  Intervention:  \" Rebuild trust and relationships with my wife and family and internally with myself\"      Group Discussion:  Relationship Gratitude Tips    Gain a couples " counselor    Talking with Children about Gambling                                In group    Ongoing    In group   AD /  JNM                                               Eff    Eff    Eff                             11/22/2022 04/11/2022 03/28/2023                  All interventions that are designated as 'current' will need to be completed in order to transition out of treatment with a favorable prognosis. The treatment plan is a flexible document and a work in progress. Interventions and goals may be added at any time to customize plan to each individual's needs. Client may work with therapist to change interventions as long as they pertain to the goals stipulated in the plan and/or are clinically driven.

## 2022-11-04 NOTE — PROGRESS NOTES
Individual counseling session:     In-person     Counselor verified Patient with 2-point verification: Patient is known to provider.    start time: 4:30pm  end time: 5:02pm    Length of session: 32 minutes    D: Patient and writer met in-person to put together treatment plan. Patient gave verbal consent for Admission Orientation Check list. (This will be faxed to [facility])   I: Counselor facilitated 1:1 session.  A: Patient appeared motivated to discuss recovery steps  P: Patient to attend group in-person this evening.     Jeni BRIZUELA, CGC

## 2022-11-04 NOTE — PROGRESS NOTES
"Patient:  Anastacio Middleton                         Date of Assessment: 10/26/2022            Problem Gambling Progress Note and Treatment Plan Review     Attendance  Group/Individual: Phase I. Patient attends both group and individual sessions in-person and virtually.      Groups Attended: 10/27/2022, 11/01/2022,11/03/2022 and individual session on 11/03/2022    Support group attended this week: no    Reporting sobriety:  yes    Client Treatment Goals:   1. Rebuilding family relationships   2. Learning coping skills so not to relapse   3. Feeling better emotionally from what s happened, feeling more like myself\"         Treatment Plan     Treatment Plan Review competed on: November 4, 2022    Staff Members contributing:  CHATA [place] [facility] [place] [facility] [place]                         Received Supervision:  yes    Client: contributed to goals and plan.  Client received copy of plan/revised plan: Yes  Client agrees with plan/revised plan: No    Changes to Treatment Plan: No  New Goals added since last review:  No additional goals added at this time.    Goals worked on since last review:  Dimension 1 Patient reports no gambling at this time.   Dimension 2 Patient reports ability to navigate the healthcare system as needed.   Dimension 3 Patient attended group with topic discussed: Distorted thinking styles.   Dimension 4 Patient attending both group session this week and worked on treatment plan in individual session.   Dimension 5 Patient attended group with topic discussed: diagram of \"The addiction cycle\".   Dimension 6 Patient shared with group members that he is spending more time with family (wife and children).     Strategies effective: yes    Strategies need these changes:   To gain a mental health therapist, continues to build on daily structure and balance.    Depression and Anxiety at Intake:   Patient's PHQ-9 was 6 out of 27, indicating mild depression.  Patient's LANA-7 score was 9 out of 21, " indicating mild anxiety.      Intake Dimension Ratings:    Dimension 1  0    Dimension 2  1    Dimension 3  1    Dimension 4  0    Dimension 5  2    Dimension 6  1      Guide to Risk Ratings for Suicidality:   IDEATION: Active thoughts of suicide? INTENT: Intent to follow on suicide? PLAN: Plan to follow through on suicide? Level of Risk:   IF Yes Yes Yes Patient = High Emergent   IF Yes Yes No Patient = High Urgent/Non-Emergent   IF Yes No No Patient = Moderate Non-Urgent   IF No No   No Patient = Low Risk   The patient's ADDITIONAL RISK FACTORS and lack of PROTECTIVE FACTORS may increase their overall suicide risk ratings.     Patient's/client's current risk rating:  Low Risk    Safety Plan on File  No risk identified    Family Involvement:   none schedule this week    Data:   client did participate      Intervention:   Education  Group feedback      Assessment:   Stages of Change Model  Action    Appears/Sounds:  Motivated  Engaged      Plan:  Focus on recovery environment  Monitor emotional/physical health      Jeni BRIZUELA, CGC

## 2022-11-04 NOTE — TREATMENT PLAN
** Disregard this Treatment plan, placed in wrong area of patient's chart. Correct and upto date treatment plan on 10/26/2022. AMD 11/04/2022      [facility]  Adult Gambling Program  Treatment Plan Requirements    These services are provided by the facility for each patient/client according to the individual's treatment plan:  Individual and group counseling  Education  Transition services  Services to address any co-occurring mental illness  Service coordination    Initial Treatment Plan Goals if noted in plan:  Complete all the requirements of Program Orientation.  Maintain medication compliance throughout the program.  Complete gambling therapy for identified issues on your problem list.  Gain family involvement in treatment process to address family issues from the problem list.  Attend and participate in all required group(s) per individual treatment plan.  Focus attention to individualized issues from the treatment plan.  Schedule a physical examination if recommended.    In addition to the above, complete all individual goals as specifically outlines on your treatment plan.    Criteria for discharge:  Patients/clients are discharged from the program following completion of the entire program including Phase I and II or acceptance of other post-treatment referrals to mental health providers, or aftercare at other facilities.  Patients/clients may also be discharged for inappropriate behavior, chemical use or gambling.    Favorable Discharge - Patients/clients have completed agreed upon treatment goals, understand their diagnosis and appear motivated about the follow-up care.  Guarded Discharge - Patients/clients have demonstrated some understanding of their diagnosis and recovery process and have completed some of their treatment goals.  This prognosis also includes patients/clients who have completed some treatment goals but have not made commitment to community support or follow  "through with referrals.  Unfavorable Discharge - Patients/clients have not completed agreed upon treatment goals due to their own choice, have limited understanding of their diagnosis, and have shown minimal or inconsistent behavior conducive to recovery.  Those patients/clients discharged due to behavioral problems will also be unfavorable discharges.                                  Adult Gambling Treatment Plan     Anastacio Middleton       [id number]  1990       32 year old       male                                   Treatment Plan Goals     PT identified treatment goals as:    1.  2.  3.    Acute Intoxication/Withdrawal Potential      DIMENSION 1  RISK FACTOR:        Date  Source  Prob/Goal/  Intervention  Target  Date  Initials  Outcome  Completion  Date    November 3, 2022     Self - Current,   and Assessment - Current   Problem:   -PT reports last gambling behavior on .      Goal:   -Limit, if not abstain, from alcohol, and abstain from all other mood altering chemicals due to history of addiction and/or concern of cross addiction.    -Remain gambling free.  \"Been alcohol free entire life, abstain from other aliza altering chemicals and stop smoking \"    Intervention:   -Report to counselor any concern or abuse with alcohol, drug use, or gambling during treatment.     -PT denies any gambling behaviors                               Ongoing        Ongoing         AD /  JNM                                                               Current              Biomedical Conditions and Complaints      DIMENSION 2  RISK FACTOR:          Date Source  Prob/Goal/  Intervention  Target  Date  Initials  Outcome  Completion  Date   November 3, 2022 Self - Current, and Assessment - Current  Problem:   -Patient reports a medical diagnosis of .      Goal:  -Follow recommendations of medical provider.  \" Follow doctor reccomendations \"    Intervention:  -Keep and attend scheduled medical appointments and follow " "recommendations  .  -Take medications as prescribed                           Ongoing      As prescribed   AD /  JNM     November 3, 2022 Self - Current, and Assessment - Current   Problem:   -Poor exercise, diet, and sleep habits.     Goal:   -Improve physical and psychological health.   \" Get into a consistent exercise routine and michelle a mental health therapist \"    Interventions:   -Start an exercise program with regards to your ability, eat 3 nutritional meals daily and get 6-8 hours of sleep nightly.     -Gain an updated physical    Group:  Educational lecture/group discussion on Post Acute Withdrawal (PAW).    Group:  Educational lecture/group discussion on nutrition and sleep hygiene in early recovery.                     Ongoing        Ongoing      In group        In group    AD /  JNM                 Emotional/Behavioral/Cognitive Conditions and Complications     DIMENSION 3  RISK FACTOR:             Date Source Prob/Goal/  Intervention Target  Date Initials Outcome Completion  Date   November 3, 2022 Self - Current, and Assessment - Current   Problem:   -Patient lacks impulse control and understanding of the effects of compulsive gambling on emotions and thinking patterns.    Goal:   -To understand the effects of compulsive gambling on your emotions.  -Learn to self soothe and calm any racing thoughts.  -Strengthen rational thought patterns.  \" Develop coping mechanisms and learn meditation and other body calming techniques \"    Intervention:  -Utilize emotions list, and begin journaling daily and share progress with counseling staff.    Group Discussion:  Discuss emotional effects of fears that impact recovery and how self-sabotaging behaviors impact recovery.    Group Discussion:  Identify various levels of feelings /emotional regulation including underlying emotions to anger    Group Discussion:  -Levels of anger, ways of resolution and identifying underlying emotions and resentments.    ntervention: " "  -Introduction to Meditation, Mindfulness, yoga, and stress reduction through group meditation, coloring pages, and other coping skills shared in group.    Group videos:    -Gene Carrion: Listening to Shame    Group Discussion:  Identifying feelings of Guilt and Shame    Group Discussion:  -Identifying underlying irrational rules, Distorted thinking patterns    Group Discussion:  -Understanding defense mechanisms                               Ongoing          In group          In group          In group          In group            In group      In group      In group        In group   AD /  [facility]                             November 3, 2022 Self - Current, and Assessment - Current   Problem:   -Patient's self-esteem has been negatively impacted due to gambling and subsequent consequences.    Goal:   -To clarify self-concept.  Recognize how this has affected behaviors and how it can affect recovery and build self-esteem and self-worth.  \" To become more connected with self, learn to accept what I have done \"    Intervention:  Assignment:  -Explore the Real you, Ch. 21,  Who are you Ch. 22    Group Discussion:  -Increasing self-worth including implementing self-compassion.    Group Activity/Discussion:  \"Who Am I\"                             In group        In group        In group   AD /  JNM                                                                November 3, 2022 Self - Current Assessment - Current   Problem:    -Patient reports a mental health diagnosis    Goal:  -Continue to stabilize mental health.   -Develop awareness of how mental health issues affect gambling and vice versa.    -Make preparations for individual therapy  \" Get a mental health therapist and build better mental habbits \"  Intervention:     -Complete safety plan    -Update safety plan as needed    -Gain a mental health therapist in the community for ongoing support.                            Intake    As needed    Ongoing       AD " "/  JNM                                           November 3, 2022 Self - Current, and Assessment - Current   Problem:   -Patient struggles to have open and clear communication with others.    Goal:    -Increase understanding of clear communication styles.  \" Work to improve personal communication with family to be open and more volnerable, go to wife when help is needed.  \"    Intervention:  Group Discussion:  -Communication Styles in Action: Identifying communication styles and characteristics of each style.    Group Discussion:  \"The 5 Love Languages\" by Javier Plunkett                         In group          In group     AD /  JNM                   November 3, 2022 Self - Current, and Assessment - Current   Problem:  Goal:   Intervention:    AD /  JNM     November 3, 2022 Self - Current, and Assessment - Current   Problem:  Goal:   Intervention:    AD /  JNM               Readiness to Change     DIMENSION 4  RISK FACTOR:               Date Source Prob/Goal/  Intervention Target  Date Initials Outcome Completion  Date   November 3, 2022 Self - Current, Assessment - Current   Problem:   -Patient diagnosed with Disordered Gambling (DSM-V).  PT has a combination of both internal and external motivation.    Goal:    -To refrain from gambling, increase knowledge of problem gambling, and to gain skills to help remain gambling free.  \" Abstain from gambling, learn techniques to avoid situations of gambling. \"     Intervention:  -24 Sessions of Phase I group (T/W/TH: 5:30 - 7:30pm)  -20 Sessions of Phase II group (T/TH: 5:30 - 7:30pm)  -Additional counseling sessions as deemed beneficial  -Phase III-1 night per week of group  or peer led meeting (Mondays 5:30-7:30)      Assignment:  -Setting and Pursuing Goals in Recovery     -Treatment Topic Two:  Strengthening your commitment to change    Weekly:   Weekly verbal check in                             Phase I    Phase II    Ongoing    Phase III      In group    In " "group        Weekly     AD /  JNM                                             Current        Current                      Current    November 3, 2022 Self - Current, and Assessment - Current   Problem:   -Patient lack of understanding the illness of compulsive gambling and life areas impacted.     Goal:   -To understand and gain awareness of the disease of compulsive gambling as a chronic, progressive incurable (but treatable) disease and to examine all areas affected.   -Increase motivation  \" Learn more about gambling and ways it affects you mind and others around you \"    Intervention:  Handout:  -Gambling and the Brain    Group:  Share in group what brought you to outpatient treatment and how your gambling progressed.    Group Discussion:  Understanding the biology of change and rewiring neurons and mirror neurons.    Group Discussion:  -Discussion on the Jellinek Curve of Compulsive Gambling and Recovery with comparison to the effects and progression of Compulsive Gambling on the partner.                                   Orientation Folder    In group          In group        In group           AD /  JNM                                                          November 3, 2022 Self - Current, and Assessment - Current   Problem:    -Patient is disconnected from their Humanity, values, and virtues.    Goal:    -Decrease negative self-talk, increase sense of purpose in life and their value to society.  \" Improve self esteem and examine life goals and family goals\"    Intervention:    Group Discussion:  -Morals, values and ethics.  Practicing values in recovery.    Group Video:  -\"The science of happiness\"    Group Discussion:  -Practicing gratitude    Group:  -Positive reading                         In group        In group      In group      Weekly AD /  JNM                                                               Current                        November 3, 2022 Self - Current, and Assessment - Current  " "Problem:  Goal:  Intervention:  AD /  JNM         Relapse/Continued Use/Continued Problem Potential     DIMENSION 5  RISK FACTOR:                  Date Source Prob/Goal/  Intervention Target  Date Initials Outcome Completion  Date   November 3, 2022 Self - Current, and Assessment - Current   Problem:    -Patient has poor relationship with money, large amounts of money gambled, and financial stressors because of gambling.    Goal:   -Remove all access to money and involvement in financial matters, protect assets.  reduce financial stress and gain insight into money relationship  \" Put in financial controls and give money control to my spouse and avoid making any financial decisions by myself \"    Intervention:  -Set up financial safeguards, such as a trusted person, bank, and/or other financial institution such as [employer].    Assignment:  -\"What is your money color?  &  Money Mechanics     -Chapter 16:  Emotional Meaning of Money    -Chapter 17:  Money and Finances    Speaker:     Family means                             Ongoing          In group      In group    In group      In Group AD /  JNM                                     November 3, 2022 Self - Current, and Assessment - Current   Problem:    -Patient lacks an understanding of relapse triggers and cues and personal relapse process    Goal:    -Gain an understanding of events, places, and other variables can trigger a relapse.  \" Learn about my personal triggers and potential relapse signs and make a plan of what to do when they arise.  \"    Intervention:  -Ban self from casino/marilou establishments - omit from mailing lists.    -[employer]    Assignment:  -Trigger Inventory    -Personal Recovery Planning    -Early Warning Signs of Relapse    -Relapse Prevention Planning    -How Far Have I Come?    Group Discussion:  -Cross addiction, feelings in early recovery and urges to drink/steele or other impulsive behaviors.    Group Discussion:  Various life stressors " "including stress on families due to addiction and stress reducing strategies.                         Ongoing      Ongoing      In group    In group    In group    In group    In group      In group          In group CASSIE /  SHANTELL                                         November 3, 2022 Self - Current, and Assessment - Current  Problem:  Goal:  Intervention:          AD /  JNM                     Recovery Environment     DIMENSION 6  RISK FACTOR:                    Date Source Prob/Goal/  Intervention Target  Date Initials Outcome Completion  Date   November 3, 2022 Self - Current, and Assessment - Current   Problem:   -Patient lacks a sense of purpose and finding balance in life.    Goal:   -Develop a sense of purpose in life, and finding purpose, meaning and balance in daily events  \"Define purpose and goals in life and make steps to achieving those goals. \"    Intervention:  Maintain active employment    Assignment:    -Finding Purpose & Life Purpose     -Finding daily Balance with work family and recovery.    Group Discussion:  -Alternatives to gambling - finding leisure activies supportive of recovery \"Leisure and Balance Wheel\".                           Ongoing      In group    In group        In group AD /  MARENM                     November 3, 2022 Self - Current, and Assessment - Current   Problem:   -Lack of a sustained relationship with a higher power of your understanding and/or cultural supports.    Goal:   -Develop and nurture relationship with a higher power of your understanding and integrate personal beliefs and traditions to serve as a framework for healing in treatment.  \" Continue to go to Mu-ism and make life decisions biased on morality \"    Intervention  -Patient to search out and join a culturally specific group, team, spiritual center or social event that is help at least monthly to increase support for long term recovery.    Group Discussion:  -Understanding Spirituality, humanity, and how they " "impact recovery.                             Ongoing            In group   AD /  JNM                 November 3, 2022 Self - Current, and Assessment - Current   Problem:   -Patient lacks a strong sober support system and connection with others.    Goal:   -Develop a strong network of people in recovery  \" Have a network of People that I can lean on within this treatment group and other family members as support \"    Intervention:  Group work:    -Alternatives to the 12 steps    -Identify GA/support meetings you can attend    Video:  \"Everything You Know About Addiction is Wrong\"     Speaker:    -Attend Phase III peer led informational session (Mondays: 5:30 - 7:30 PM)    -GA Member Speaker                           In group    In group      In group        In group      In group       AD /  JN                                                         November 3, 2022 Self - Current, and Assessment - Current   Problem:   -Patient is currently unemployed.    Goal:   -Seek employment conducive to recovery.  \"  \"    Intervention:    .-Continue active job hunting throughout treatment and report updates to counselor.                     Ongoing       AD /  JNM               November 3, 2022 Self - Current, and Assessment - Current   Problem:   -Relationships with family/concerned persons have been strained through use and related behaviors    Goal:    -Begin healing damaged relationships.  Allow adequate time for healing to occur  Intervention:  \" Rebuild trust and relationships with my wife and family and internally with myself\"    Intervention:  -Invite family members/concerned persons to family sessions.    Assignments:    -Treatment Topic Five:  Healing Relationships    -Family Ch. 24, Relationships Ch. 25, rebuilding relationships in recovery,     -Understanding family history.    Group Discussion:  -Healthy Relationships group - Anderson Platt Theory    Speaker:  Mell    Group Discussion:  -Identify codependency, " "enabling and boundaries in relationships                           Phase I        In group    In group      In group      In group        In group      In group AD /  JNM                                                                       November 3, 2022 Self - Current, and Assessment - Current   Problem: -Patient reports current legal involvement.    Goal:    -Understand the correlation between your use and your legal system involvement.    -Remain law abiding and follow probation/court guidelines and begin to resolve legal issues.    Intervention:   -Avoid further negative involvement with the criminal justice system.    -Stay in touch with probation office and remain compliant with the terms.                       Ongoing      Ongoing   AD /  JNM                                       November 3, 2022 Self - Current, and Assessment - Current   Problem:   -Patient has suffered the effects of problem gambling.    Goal:   -Complete Treatment with a plan for long term recovery - Aftercare planning.  \" Complete treatment and have a long term plan and long term resources\"    Intervention:   Assignment:    -Planning Aftercare    -Continuing Care Plan  for Long Term Recovery                       In group    Pre- discharge AD /  JNM                     November 3, 2022 Self - Current, and Assessment - Current   Problem:  Goal:  Intervention:                AD /  JNM                         All interventions that are designated as 'current' will need to be completed in order to transition out of treatment with a favorable prognosis. The treatment plan is a flexible document and a work in progress. Interventions and goals may be added at any time to customize plan to each individual's needs. Client may work with therapist to change interventions as long as they pertain to the goals stipulated in the plan and/or are clinically driven.    "

## 2022-11-04 NOTE — ADDENDUM NOTE
Encounter addended by: Jeni Schmitz [facility] on: 11/3/2022 9:11 PM   Actions taken: Clinical Note Signed

## 2022-11-08 ENCOUNTER — HOSPITAL ENCOUNTER (OUTPATIENT)
Dept: BEHAVIORAL HEALTH | Facility: CLINIC | Age: 32
Discharge: HOME | End: 2022-11-08
Attending: FAMILY MEDICINE
Payer: COMMERCIAL

## 2022-11-08 PROCEDURE — 90853 GROUP PSYCHOTHERAPY: CPT | Mod: GT,95

## 2022-11-09 NOTE — PROGRESS NOTES
Group Therapy Documentation     Was the patient seen in-person or via Telemedicine (if in-person skip to Group Note): Telemedicine    If Telemedicine: The patient's condition can be safely assessed and treated via synchronous audio and visual telemedicine encounter. ? ?      Reason for Telemedicine Visit: Patient has requested telehealth visit    Originating Site (Patient Location): Patient's home    Distant Site (Provider Location): Provider Remote Setting- Home Office    Consent: ?The patient/guardian has verbally consented to: the potential risks and benefits of telemedicine (video visit) versus in person care; bill my insurance or make self-payment for services provided; and responsibility for payment of non-covered services.       Mode of Communication:??Video Conference via Zoom      As the provider I attest to compliance with applicable laws and regulations related to telemedicine.        Patient attended a video group session on the following days: ?          GROUP NOTE:  DATE OF SERVICE:  November 8, 2022    START TIME:  5:30pm    END TIME:  7:16 PM    Group Length:   106 minutes    FACILITATOR(S):    CHATA Perkins, [facility]    TOPIC: BEH Group Therapy, Problem Gambling Group     Number of patients attending the group: 7    Group Therapy Type:  Problem Gambling Group    Group Attendance:  Client attended group     Summary of Group / Topics Discussed: Group began with group members checking in with introductions and events from the past week.  Group discussion was fear in recovery and the recovery process.  Discussion was held on levels of fear, fear of relapse, fear of success in recovery and fear of vulnerability and sharing emotions.  Group members shared various fears of recovery and group discussed various ways to address recovery related fears supportive of recovery.     Patient's response to the group topic/interactions:  PT appeared to gain insight into identifying recovery related fears  and managing them in supportive ways.      Client specific details: PT actively shared and participated in group discussion.  PT shared he continues to have open discussions with his wife.      Clotilde Fox, CHATA,  CGC

## 2022-11-10 ENCOUNTER — HOSPITAL ENCOUNTER (OUTPATIENT)
Dept: BEHAVIORAL HEALTH | Facility: CLINIC | Age: 32
Discharge: HOME | End: 2022-11-10
Attending: FAMILY MEDICINE
Payer: COMMERCIAL

## 2022-11-10 PROCEDURE — 90853 GROUP PSYCHOTHERAPY: CPT | Mod: GT,95

## 2022-11-11 NOTE — PROGRESS NOTES
".Group Therapy Documentation     Was the patient seen in-person or via Telemedicine (if in-person skip to Group Note): Telemedicine    If Telemedicine: The patient's condition can be safely assessed and treated via synchronous audio and visual telemedicine encounter. ? ?      Reason for Telemedicine Visit: Patient has requested telehealth visit    Originating Site (Patient Location): Patient's home    Distant Site (Provider Location): Provider Remote Setting- Home Office    Consent: ?The patient/guardian has verbally consented to: the potential risks and benefits of telemedicine (video visit) versus in person care; bill my insurance or make self-payment for services provided; and responsibility for payment of non-covered services.       Mode of Communication:??Video Conference via Zoom      As the provider I attest to compliance with applicable laws and regulations related to telemedicine.        Patient attended a video group session on the following days: ?          GROUP NOTE:  DATE OF SERVICE:  November 10, 2022    START TIME:  5:30pm    END TIME:  7:12 PM    Group Length:   102 minutes    FACILITATOR(S):    CHATA Perkins, [facility]    TOPIC: BEH Group Therapy, Problem Gambling Group     Number of patients attending the group: 6    Group Therapy Type:  Problem Gambling Group    Group Attendance:  Client attended group     Summary of Group / Topics Discussed: Group members checked in.  Group discussion was held on connection in recovery.  Group members watched TEDvideo \"Everything you know about addiction is wrong\" by Chris Bruno.  Group discussion followed discussing connection, barriers to connection and various ways to connect, in addition to finding purpose.  Group closed with a virtue reading on trust.    Patient's response to the group topic/interactions:  PT appeared to gain insight into various ways to connect to others in recovery.      Client specific details: PT actively shared and " participated in group discussion.    Clotilde Fox, CHATA, CGC

## 2022-11-11 NOTE — PROGRESS NOTES
"Patient:  Anastacio Middleton                         Date of Assessment: 10/26/2022            Problem Gambling Progress Note and Treatment Plan Review     Attendance  Group/Individual: Phase I.    Groups Attended: 11/8/2022 & 11/10/2022    Support group attended this week: Unknown    Reporting sobriety:  No reports of gambling.    Client Treatment Goals:   1. Rebuilding family relationships   2. Learning coping skills so not to relapse   3. Feeling better emotionally from what s happened, feeling more like myself\"      Treatment Plan     Treatment Plan Review competed on: November 10, 2022    Staff Members contributing:  Clotilde Fox, [facility], [facility] & Jeni Schmitz [facility], [facility]                         Received Supervision:  yes    Client: contributed to goals and plan.  Client received copy of plan/revised plan: Yes  Client agrees with plan/revised plan: Yes    Changes to Treatment Plan: No  New Goals added since last review:  No additional goals added at this time.    Goals worked on since last review:  Dimension 1 No reports of gambling.  Dimension 2 No changes reported.  Dimension 3 PT attended group discussion on recovery related fears.  Dimension 4 PT attends group and actively participates.  Dimension 5 PT attended group discussion on connection in recovery.  Dimension 6 PT continues to build on supports.    Strategies effective: yes    Strategies need these changes: Build on supports in the community.    Depression and Anxiety at Intake:   Patient's PHQ-9 was 6 out of 27, indicating mild depression.  Patient's LANA-7 score was 9 out of 21, indicating mild anxiety.       Intake Dimension Ratings:    Dimension 1  0    Dimension 2  1    Dimension 3  1    Dimension 4  0    Dimension 5  2    Dimension 6  1      Guide to Risk Ratings for Suicidality:   IDEATION: Active thoughts of suicide? INTENT: Intent to follow on suicide? PLAN: Plan to follow through on suicide? Level of Risk:   IF Yes Yes Yes Patient = High Emergent "   IF Yes Yes No Patient = High Urgent/Non-Emergent   IF Yes No No Patient = Moderate Non-Urgent   IF No No   No Patient = Low Risk   The patient's ADDITIONAL RISK FACTORS and lack of PROTECTIVE FACTORS may increase their overall suicide risk ratings.     Patient's/client's current risk rating:  Low Risk  Safety Plan on File  No risk identified.    Family Involvement:   none schedule this week    Data:   client did actively participate    Intervention:   Counselor feedback  Relapse prevention    Assessment:   Stages of Change Model  Action    Appears/Sounds:  Cooperative  Motivated  Engaged    Plan:  Focus on recovery environment  Monitor emotional/physical health  Continue to work towards treatment plan goals.    Clotilde Fox, CHATA, CGC

## 2022-11-14 ENCOUNTER — TELEPHONE (OUTPATIENT)
Dept: BEHAVIORAL HEALTH | Facility: CLINIC | Age: 32
End: 2022-11-14

## 2022-11-14 NOTE — TELEPHONE ENCOUNTER
----- Message from Jeni Schmitz [facility] sent at 11/14/2022  9:19 AM CST -----  Regarding: return appointment  Hello,     Could you please put this patient on my counseling scheduled for Wednesday 11/16 at 4pm for a 60 minute virtual individual appointment on [facility]?     Thank you,     Jeni Schmitz

## 2022-11-15 ENCOUNTER — HOSPITAL ENCOUNTER (OUTPATIENT)
Dept: BEHAVIORAL HEALTH | Facility: CLINIC | Age: 32
Discharge: HOME | End: 2022-11-15
Attending: FAMILY MEDICINE
Payer: COMMERCIAL

## 2022-11-15 PROCEDURE — 90853 GROUP PSYCHOTHERAPY: CPT | Mod: GT,95

## 2022-11-16 ENCOUNTER — HOSPITAL ENCOUNTER (OUTPATIENT)
Dept: BEHAVIORAL HEALTH | Facility: CLINIC | Age: 32
Discharge: HOME | End: 2022-11-16
Attending: FAMILY MEDICINE
Payer: COMMERCIAL

## 2022-11-16 PROCEDURE — 90832 PSYTX W PT 30 MINUTES: CPT | Mod: GT,95

## 2022-11-16 NOTE — PROGRESS NOTES
"Group Therapy Documentation     Was the patient seen in-person or via Telemedicine (if in-person skip to Group Note): Telemedicine    If Telemedicine: The patient's condition can be safely assessed and treated via synchronous audio and visual telemedicine encounter. ? ?      Reason for Telemedicine Visit: Patient has requested telehealth visit    Originating Site (Patient Location): Patient's home    Distant Site (Provider Location): Provider Remote Setting- Home Office    Consent: ?The patient/guardian has verbally consented to: the potential risks and benefits of telemedicine (video visit) versus in person care; bill my insurance or make self-payment for services provided; and responsibility for payment of non-covered services.       Mode of Communication:??Video Conference via Zoom      As the provider I attest to compliance with applicable laws and regulations related to telemedicine.        Patient attended a video group session on the following days: ?          GROUP NOTE:  DATE OF SERVICE:  November 15, 2022    START TIME:  5:30pm    END TIME:  7:23 PM    Group Length:  113 minutes    FACILITATOR(S):    CHATA Perkins, [facility]    TOPIC: BEH Group Therapy, Problem Gambling Group     Number of patients attending the group: 8    Group Therapy Type:  Problem Gambling Group    Group Attendance:  Client attended group     Summary of Group / Topics Discussed: PT's checked in sharing events from the past week.  Counselor shared information on the Family Group that will begin and graduating group member. Group discussion was held on assignment \"Taking Daily Inventory\". Topics included honesty with self, others, confidence, courage, forgiveness, and trust as well as lying by omission.  Group ended with a virtue reading on Acceptance.     Patient's response to the group topic/interactions:  PT's appeared to gain insight into exploring daily inventory in their recovery utilizing the assignment in their " folder.      Client specific details: PT shared he and his wife will begin couples counseling.  PT actively shared and participated in group discussion.    CHATA Perkins

## 2022-11-16 NOTE — PROGRESS NOTES
Individual counseling session:    Telemedicine Visit: The patient's condition can be safely assessed and treated via synchronous audio and visual telemedicine encounter.      Reason for Telemedicine Visit: Patient has requested telehealth visit    Originating Site (Patient Location): Patient's home    Distant Site (Provider Location): Provider Remote Setting- Home Office    Consent:  The patient/guardian has verbally consented to: the potential risks and benefits of telemedicine (video visit) versus in person care; bill my insurance or make self-payment for services provided; and responsibility for payment of non-covered services.     Mode of Communication:  Video Conference via [employer]    As the provider I attest to compliance with applicable laws and regulations related to telemedicine.    Counselor verified Patient with 2-point verification: Patient is known to provider.    Video visit start time: 4:00pm  Video visit end time: 4:37pm    Length of session: 37 MInutes    D: Writer and patient met for a scheduled individual session. Patient reports no gambling or stock market trades. Patient shared with writer his conversation with wife about counseling and gratitude. Patient and wife have a couples counseling appointment with [facility] on 11/30/22. Patient also shared how his previous injury is still effecting him emotionally.   I: Counselor facilitated 1:1 session.  A: Patient appeared motivated in his recovery.   P: For homework patient will search online for information on EMDR therapy and also will search for ways to give gratitude to self and to wife. Patient will meet again for an individual session with writer next week 11/23/22 at 2:00pm      Jeni BRIZUELA, [facility]

## 2022-11-16 NOTE — ADDENDUM NOTE
Encounter addended by: Jeni Schmitz [facility] on: 11/16/2022 4:59 PM   Actions taken: Clinical Note Signed

## 2022-11-17 ENCOUNTER — HOSPITAL ENCOUNTER (OUTPATIENT)
Dept: BEHAVIORAL HEALTH | Facility: CLINIC | Age: 32
Discharge: HOME | End: 2022-11-17
Attending: FAMILY MEDICINE
Payer: COMMERCIAL

## 2022-11-17 PROCEDURE — 90853 GROUP PSYCHOTHERAPY: CPT | Mod: GT,95

## 2022-11-18 NOTE — PROGRESS NOTES
"Group Therapy Documentation     Was the patient seen in-person or via Telemedicine (if in-person skip to Group Note): Telemedicine    If Telemedicine: The patient's condition can be safely assessed and treated via synchronous audio and visual telemedicine encounter. ? ?      Reason for Telemedicine Visit: Patient has requested telehealth visit    Originating Site (Patient Location): Patient's home    Distant Site (Provider Location): Provider Remote Setting- Home Office    Consent: ?The patient/guardian has verbally consented to: the potential risks and benefits of telemedicine (video visit) versus in person care; bill my insurance or make self-payment for services provided; and responsibility for payment of non-covered services.       Mode of Communication:??Video Conference via Zoom      As the provider I attest to compliance with applicable laws and regulations related to telemedicine.        Patient attended a video group session on the following days: ?          GROUP NOTE:  DATE OF SERVICE:  November 17, 2022    START TIME:  5:30pm    END TIME:  7:14pm    Group Length:   104 minutes    FACILITATOR(S):    Jeni BRIZUELA [facility]    TOPIC: BEH Group Therapy, Problem Gambling Group     Number of patients attending the group: 5    Group Therapy Type:  Problem Gambling Group    Group Attendance:  Client attended group     Summary of Group / Topics Discussed:   Group started with a welcome to all group members. Each group member took turns with check in and identifying two feelings. Writer shared with group mindfulness activity \"Get into the Spirit\" How using smiling and laughter can produce positivity and relaxation. Group took turns with reading and discussing PAWS - Post Acute Withdrawal Syndrome and the benefits of journaling. Group members watch a short video \" Sleep Hygiene\" and discussed different skills they could use to get a better night s rest. Group ended with a reading from A Year of Positive Thinking.    "   Patient's response to the group topic/interactions:    Patient appeared motivated to learn more about positive recovery skills.      Client specific details:     Patient was an active member in group. Patient shared with group current stressors he has had this week with close relationships.       Jeni Schmitz LADC, [facility]

## 2022-11-21 NOTE — PROGRESS NOTES
"Patient:  Anastacio Middleton                         Date of Assessment: 10/26/2022            Problem Gambling Progress Note and Treatment Plan Review     Attendance  Group/Individual: Phase I      Groups Attended: 11/15/2022, 11/17/2022    Support group attended this week: no    Reporting sobriety:  yes    Client Treatment Goals:   1. Rebuilding family relationships   2. Learning coping skills so not to relapse   3. Feeling better emotionally from what s happened, feeling more like myself\"         Treatment Plan     Treatment Plan Review competed on: November 21, 2022    Staff Members contributing:  Clotilde Fox, [facility], [facility] & Jeni Schmitz, CHATA, [facility]                         Received Supervision:  yes    Client: contributed to goals and plan.  Client received copy of plan/revised plan: Yes  Client agrees with plan/revised plan: Yes    Changes to Treatment Plan: No  New Goals added since last review:  No additional goals added at this time.    Goals worked on since last review:  Dimension 1 Patient reports no gambling.   Dimension 2 Patient attended group with topic and video on Sleep Hygiene.  Dimension 3 Patient attended group with topic\" Taking Daily Inventory\".  Dimension 4 Patient attends group and adds to discussion.   Dimension 5 Patient continues to work on recovery and on treatment plan goals.   Dimension 6 Patient reported he and wife will start couples counseling on Nov 30th.     Strategies effective: yes    Strategies need these changes:   Build on supports in community and attain an individual mental health therapist.     Depression and Anxiety at Intake:   Patient's PHQ-9 was 6 out of 27, indicating mild depression.  Patient's LANA-7 score was 9 out of 21, indicating mild anxiety.         Intake Dimension Ratings:    Dimension 1  0    Dimension 2  1    Dimension 3  1    Dimension 4  0    Dimension 5  2    Dimension 6  1      Guide to Risk Ratings for Suicidality:   IDEATION: Active thoughts of suicide? " INTENT: Intent to follow on suicide? PLAN: Plan to follow through on suicide? Level of Risk:   IF Yes Yes Yes Patient = High Emergent   IF Yes Yes No Patient = High Urgent/Non-Emergent   IF Yes No No Patient = Moderate Non-Urgent   IF No No   No Patient = Low Risk   The patient's ADDITIONAL RISK FACTORS and lack of PROTECTIVE FACTORS may increase their overall suicide risk ratings.     Patient's/client's current risk rating:  Low Risk    Safety Plan on File  No risk identified    Family Involvement:   none schedule this week    Data:   client did actively participate      Intervention:   Education  Group feedback      Assessment:   Stages of Change Model  Action    Appears/Sounds:  Cooperative  Engaged      Plan:  Focus on recovery environment  Monitor emotional/physical health      Jeni BRIZUELA, CGC

## 2022-11-21 NOTE — ADDENDUM NOTE
Encounter addended by: Jeni Schmitz [facility] on: 11/21/2022 2:35 PM   Actions taken: Clinical Note Signed

## 2022-11-22 ENCOUNTER — HOSPITAL ENCOUNTER (OUTPATIENT)
Dept: BEHAVIORAL HEALTH | Facility: CLINIC | Age: 32
Discharge: HOME | End: 2022-11-22
Attending: FAMILY MEDICINE
Payer: COMMERCIAL

## 2022-11-22 PROCEDURE — 90853 GROUP PSYCHOTHERAPY: CPT | Mod: GT,95

## 2022-11-23 ENCOUNTER — HOSPITAL ENCOUNTER (OUTPATIENT)
Dept: BEHAVIORAL HEALTH | Facility: CLINIC | Age: 32
Discharge: HOME | End: 2022-11-23
Attending: FAMILY MEDICINE
Payer: COMMERCIAL

## 2022-11-23 PROCEDURE — 90832 PSYTX W PT 30 MINUTES: CPT | Mod: GT,95

## 2022-11-23 NOTE — PROGRESS NOTES
Individual counseling session:    Telemedicine Visit: The patient's condition can be safely assessed and treated via synchronous audio and visual telemedicine encounter.      Reason for Telemedicine Visit: Patient has requested telehealth visit    Originating Site (Patient Location): Patient's home    Distant Site (Provider Location): Provider Remote Setting- Home Office    Consent:  The patient/guardian has verbally consented to: the potential risks and benefits of telemedicine (video visit) versus in person care; bill my insurance or make self-payment for services provided; and responsibility for payment of non-covered services.     Mode of Communication:  Video Conference via [employer]    As the provider I attest to compliance with applicable laws and regulations related to telemedicine.    Counselor verified Patient with 2-point verification: Patient is known to provider.    Video visit start time: 2:00pm  Video visit end time: 2:30pm    Length of session: 30 minutes    D: Writer and patient met for a scheduled individual session via [employer]. Patient shared and discussed with writer what information he found online about Gratitude and EMDR (topics discussed at last individual session). Patient also shared with writer that he will be attending the upcoming family group but at this time, his wife will not be attending. Patient shared he continues to be no longer smoking as of 11/13/22.   I: Counselor facilitated 1:1 session.  A: Patient appears to continue to work on treatment plan goals.   P: Patient will meet with writer again for an individual session on 12/07/22 at 2pm. Patient will complete worksheet from folder on Communication. Patient to have couples counseling session at [facility] on 11/30/2022. Patient will continue to attend Tuesday and Thursday group sessions.       Jeni BRIZUELA, CGC

## 2022-11-23 NOTE — PROGRESS NOTES
"Group Therapy Documentation     Was the patient seen in-person or via Telemedicine (if in-person skip to Group Note): Telemedicine    If Telemedicine: The patient's condition can be safely assessed and treated via synchronous audio and visual telemedicine encounter. ? ?      Reason for Telemedicine Visit: Patient has requested telehealth visit    Originating Site (Patient Location): Patient's home    Distant Site (Provider Location): Provider Remote Setting- Home Office    Consent: ?The patient/guardian has verbally consented to: the potential risks and benefits of telemedicine (video visit) versus in person care; bill my insurance or make self-payment for services provided; and responsibility for payment of non-covered services.       Mode of Communication:??Video Conference via Zoom      As the provider I attest to compliance with applicable laws and regulations related to telemedicine.        Patient attended a video group session on the following days: ?          GROUP NOTE:  DATE OF SERVICE:  November 22, 2022    START TIME:  5:30pm    END TIME:  7:07pm    Group Length:   97 minutes    FACILITATOR(S):    Jeni BRIZUELA CGC    TOPIC: BEH Group Therapy, Problem Gambling Group     Number of patients attending the group: 7    Group Therapy Type:  Problem Gambling Group    Group Attendance:  Client attended group     Summary of Group / Topics Discussed:   Group began with a welcome to all group members. Each group member took turns with check in and identifying two feelings. Discussion was held on stress and the upcoming holiday. Ways to deal with stress including using the Five Senses mindfulness exercise of identifying a certain number of objects with a certain color. Each group member took turns with this exercise. Next the group took turns reading \"Relationship Gratitude Tips\" and each giving examples of how they could use these tips with their relationships. Group ended with a mindfulness reading on how to " manage goals.      Patient's response to the group topic/interactions:    Patient appeared to gain insight on mindfulness and stress reduction.       Client specific details:     Patient shared with group his nervousness of being around family for Thanksgiving with some of them knowing he is in recovery and some that do not.     Jeni Schmitz LADC, CGC

## 2022-11-29 ENCOUNTER — HOSPITAL ENCOUNTER (OUTPATIENT)
Dept: BEHAVIORAL HEALTH | Facility: CLINIC | Age: 32
Discharge: HOME | End: 2022-11-29
Attending: FAMILY MEDICINE
Payer: COMMERCIAL

## 2022-11-29 PROCEDURE — 90853 GROUP PSYCHOTHERAPY: CPT | Mod: GT,95

## 2022-11-30 NOTE — PROGRESS NOTES
Group Therapy Documentation     Was the patient seen in-person or via Telemedicine (if in-person skip to Group Note): Telemedicine    If Telemedicine: The patient's condition can be safely assessed and treated via synchronous audio and visual telemedicine encounter. ? ?      Reason for Telemedicine Visit: Patient has requested telehealth visit    Originating Site (Patient Location): Patient's home    Distant Site (Provider Location): Provider Remote Setting- Home Office    Consent: ?The patient/guardian has verbally consented to: the potential risks and benefits of telemedicine (video visit) versus in person care; bill my insurance or make self-payment for services provided; and responsibility for payment of non-covered services.       Mode of Communication:??Video Conference via Zoom      As the provider I attest to compliance with applicable laws and regulations related to telemedicine.        Patient attended a video group session on the following days: ?          GROUP NOTE:  DATE OF SERVICE:  November 29, 2022    START TIME:  5:30pm    END TIME:  7:15pm    Group Length:   105 minutes    FACILITATOR(S):    Jeni BRIZUELA CGC    TOPIC: BEH Group Therapy, Problem Gambling Group     Number of patients attending the group: 6      Group Therapy Type:  Problem Gambling Group    Group Attendance:  Client attended group     Summary of Group / Topics Discussed:   Group started with a discussion of upcoming family group that starts next Wednesday 12/07 and on Hybrid group this Thursday 12/01. Group then participated in a mindfulness activity: Re-Orienting to the Present. Patient took turns with naming and describing three objects around them, three things they could hear and there things they could touch. Ending with a discussion on using these techniques for grounding and calmness. Each patient took turns with a check in and identifying two feelings/emotions to the group. Patients identified and discussed triggers for  gambling. Identifying internal and external triggers. Group ended with a Mindfulness reading on: Performing a Secret Act of Kindness.      Patient's response to the group topic/interactions:    Patient identified internal and external triggers for gambling.       Client specific details:   Patient was an active member in group and in discussions. Patient shared his continued stress with personal relationships.     Jeni BRIZUELA, [facility]

## 2022-12-01 ENCOUNTER — HOSPITAL ENCOUNTER (OUTPATIENT)
Dept: BEHAVIORAL HEALTH | Facility: CLINIC | Age: 32
Discharge: HOME | End: 2022-12-01
Attending: FAMILY MEDICINE
Payer: COMMERCIAL

## 2022-12-01 PROCEDURE — 90853 GROUP PSYCHOTHERAPY: CPT | Mod: GT,95

## 2022-12-02 NOTE — ADDENDUM NOTE
Encounter addended by: Jeni Schmitz [facility] on: 12/2/2022 12:29 PM   Actions taken: Clinical Note Signed

## 2022-12-02 NOTE — PROGRESS NOTES
"Patient:  Anastacio Middleton                         Date of Assessment: 10/26/2022            Problem Gambling Progress Note and Treatment Plan Review     Attendance  Group/Individual: Phase I      Groups Attended: 11/22/22, 11/29/22, 12/01/22 and individual session on 11/23/22    Support group attended this week: no    Reporting sobriety:  yes    Client Treatment Goals:   1. Rebuilding family relationships   2. Learning coping skills so not to relapse   3. Feeling better emotionally from what s happened, feeling more like myself\"         Treatment Plan     Treatment Plan Review competed on: December 2, 2022    Staff Members contributing:  Clotilde Fox, [facility], [facility] & CHATA Curry, [facility]                         Received Supervision:  yes    Client: contributed to goals and plan.  Client received copy of plan/revised plan: Yes  Client agrees with plan/revised plan: Yes    Changes to Treatment Plan: No  New Goals added since last review:  No additional goals added at this time.    Goals worked on since last review:  Dimension 1 No gambling reported at this time.   Dimension 2 No changes reported.   Dimension 3 Patient working to schedule couple counseling.   Dimension 4 Patient attended group with discussion on Progression of Change and Stages of Change.   Dimension 5 Patient attended group with topics:  Identifying internal and external triggers.  Dimension 6 Patient attended group with topic Relationship Gratitude Tips.     Strategies effective: yes    Strategies need these changes:   Build on supports in community and attain an individual mental health therapist.        Depression and Anxiety at Intake:   Patient's PHQ-9 was 6 out of 27, indicating mild depression.  Patient's LANA-7 score was 9 out of 21, indicating mild anxiety.       Intake Dimension Ratings:    Dimension 1  0    Dimension 2  1    Dimension 3  1    Dimension 4  0    Dimension 5  2    Dimension 6  1      Guide to Risk Ratings for Suicidality: "   IDEATION: Active thoughts of suicide? INTENT: Intent to follow on suicide? PLAN: Plan to follow through on suicide? Level of Risk:   IF Yes Yes Yes Patient = High Emergent   IF Yes Yes No Patient = High Urgent/Non-Emergent   IF Yes No No Patient = Moderate Non-Urgent   IF No No   No Patient = Low Risk   The patient's ADDITIONAL RISK FACTORS and lack of PROTECTIVE FACTORS may increase their overall suicide risk ratings.     Patient's/client's current risk rating:  Low Risk    Safety Plan on File  No risk identified    Family Involvement:   none schedule this week    Data:   client did actively participate      Intervention:   Counselor feedback  Education  Group feedback      Assessment:   Stages of Change Model  Action    Appears/Sounds:  Motivated  Engaged      Plan:  Focus on recovery environment  Monitor emotional/physical health    Jeni BRIZUELA, CGc

## 2022-12-02 NOTE — PROGRESS NOTES
Group Therapy Documentation     Was the patient seen in-person or via Telemedicine (if in-person skip to Group Note): in-person        GROUP NOTE:  DATE OF SERVICE:  December 1, 2022    START TIME:  5:30pm    END TIME:  7:10pm    Group Length:   100 Minutes    FACILITATOR(S):    Jeni BRIZUELA CGC    TOPIC: BEH Group Therapy, Problem Gambling Group     Number of patients attending the group: 5    Group Therapy Type:  Problem Gambling Group    Group Attendance:  Client attended group     Summary of Group / Topics Discussed:   Group started with patients each taking turns for check in and sharing two feelings/emotions. Writer shared with group the new Gambling Hotline text support. Writer shared different problem gambling podcast's with patients. Asking each patient to please find and listen to one problem gambling podcast and report back in next Tuesdays group to share and summarize the podcast they listened to. Writer and patients discussed Progression of Change and Stages of Change. Group ended with a quote of wisdom.      Patient's response to the group topic/interactions:    Patient appeared to gain understanding of their current stage of change.      Client specific details:    Patient shared with group that he attended a session with wife for couples counseling yesterday but was told they would be referred to a couples counselor as the person they had this session with only does compulsive gambling counseling. Patient shared he is in the preparation and action stages of change.     Jeni BRIZUELA CGC

## 2022-12-06 ENCOUNTER — HOSPITAL ENCOUNTER (OUTPATIENT)
Dept: BEHAVIORAL HEALTH | Facility: CLINIC | Age: 32
Discharge: HOME | End: 2022-12-06
Attending: FAMILY MEDICINE
Payer: COMMERCIAL

## 2022-12-06 PROCEDURE — 90853 GROUP PSYCHOTHERAPY: CPT | Mod: GT,95

## 2022-12-07 ENCOUNTER — HOSPITAL ENCOUNTER (OUTPATIENT)
Dept: BEHAVIORAL HEALTH | Facility: CLINIC | Age: 32
Discharge: HOME | End: 2022-12-07
Attending: FAMILY MEDICINE
Payer: COMMERCIAL

## 2022-12-07 ENCOUNTER — TELEPHONE (OUTPATIENT)
Dept: BEHAVIORAL HEALTH | Facility: CLINIC | Age: 32
End: 2022-12-07

## 2022-12-07 PROCEDURE — 90832 PSYTX W PT 30 MINUTES: CPT | Mod: GT,95

## 2022-12-07 NOTE — PROGRESS NOTES
Individual counseling session:    Telemedicine Visit: The patient's condition can be safely assessed and treated via synchronous audio and visual telemedicine encounter.      Reason for Telemedicine Visit: Patient has requested telehealth visit    Originating Site (Patient Location): Patient's home    Distant Site (Provider Location): Provider Remote Setting- Home Office    Consent:  The patient/guardian has verbally consented to: the potential risks and benefits of telemedicine (video visit) versus in person care; bill my insurance or make self-payment for services provided; and responsibility for payment of non-covered services.     Mode of Communication:  Video Conference via [employer]    As the provider I attest to compliance with applicable laws and regulations related to telemedicine.    Counselor verified Patient with 2-point verification: Patient is known to provider.    Video visit start time: 2:00pm  Video visit end time: 2:28pm    Length of session: 28 minutes    D: Writer and patient met for a scheduled individual session via [employer]. Patient and writer discussed worksheets given to patient last week on communication. Patient identified being a passive communicator. Patient shared stressors with communication with wife and with family at upcoming holiday celebrations. Patient shared wife is not interested in family group at this time but patient's brother or father might be.   I: Counselor facilitated 1:1 session.  A: Patient appeared to understand different ways of communication.   P: Writer will email patient more information on active and reflective listening skills. Patient will attend group session tomorrow 12/08/22 and next individual session will be next Wednesday 12/14/22 at 2:00pm.       Jeni BRIZUELA, [facility] [place]

## 2022-12-07 NOTE — TELEPHONE ENCOUNTER
----- Message from Jeni Schmitz [facility] sent at 12/7/2022  2:35 PM CST -----  Regarding: returning individual appointment  Hello,     Could you please add patient to my counseling schedule for next Wednesday 12/14/2022 at 2pm? This will be a 60 minute video session via [employer].    Thank you,   Jeni Schmitz

## 2022-12-07 NOTE — PROGRESS NOTES
"Group Therapy Documentation     Was the patient seen in-person or via Telemedicine (if in-person skip to Group Note): Telemedicine    If Telemedicine: The patient's condition can be safely assessed and treated via synchronous audio and visual telemedicine encounter. ? ?      Reason for Telemedicine Visit: Patient has requested telehealth visit    Originating Site (Patient Location): Patient's home    Distant Site (Provider Location): Provider Remote Setting- Home Office    Consent: ?The patient/guardian has verbally consented to: the potential risks and benefits of telemedicine (video visit) versus in person care; bill my insurance or make self-payment for services provided; and responsibility for payment of non-covered services.       Mode of Communication:??Video Conference via Zoom      As the provider I attest to compliance with applicable laws and regulations related to telemedicine.        Patient attended a video group session on the following days: ?          GROUP NOTE:  DATE OF SERVICE:  December 6, 2022    START TIME:  5:30pm    END TIME:  6:52pm    Group Length:   82 minutes     FACILITATOR(S):    Jeni BRIZUELA [facility]    TOPIC: BEH Group Therapy, Problem Gambling Group     Number of patients attending the group: 5    Group Therapy Type:  Problem Gambling Group    Group Attendance:  Client attended group     Summary of Group / Topics Discussed:     Group stated with patients taking turns for check in and identifying two feelings. After check in group was notified that we have a graduation tonight in group. The group member shared about his time attending groups and shared different coping skills that have helped him with urges to steele. Then each group member including writer shared our well wishes and words of encouragement to the graduating member. A short video was watched by the group on the topic of self compassion \"Slience the Inner Critic\". Discussion was held on self compassion and different ways " "to be kind to ourself's as we would to a friend. The group ended with a mindfulnes reading \"Decorate Mindfully\".      Patient's response to the group topic/interactions:    Patient appeared to gain a better understanding of self compassion.       Client specific details:   Patient shared with group different podcasts he had found on the topic of compulsive gambling. Patient also shared with the group that he and wife had been referred to a new couples counselor at [facility] with their first session in the beginning of January.       Jeni BRIZUELA, [facility]  "

## 2022-12-08 ENCOUNTER — HOSPITAL ENCOUNTER (OUTPATIENT)
Dept: BEHAVIORAL HEALTH | Facility: CLINIC | Age: 32
Discharge: HOME | End: 2022-12-08
Attending: FAMILY MEDICINE
Payer: COMMERCIAL

## 2022-12-08 PROCEDURE — 90853 GROUP PSYCHOTHERAPY: CPT | Mod: GT,95

## 2022-12-09 NOTE — PROGRESS NOTES
Group Therapy Documentation     Was the patient seen in-person or via Telemedicine (if in-person skip to Group Note): Telemedicine    If Telemedicine: The patient's condition can be safely assessed and treated via synchronous audio and visual telemedicine encounter. ? ?      Reason for Telemedicine Visit: Patient has requested telehealth visit    Originating Site (Patient Location): Patient's home    Distant Site (Provider Location): Provider Remote Setting- Home Office    Consent: ?The patient/guardian has verbally consented to: the potential risks and benefits of telemedicine (video visit) versus in person care; bill my insurance or make self-payment for services provided; and responsibility for payment of non-covered services.       Mode of Communication:??Video Conference via Zoom      As the provider I attest to compliance with applicable laws and regulations related to telemedicine.        Patient attended a video group session on the following days: ?          GROUP NOTE:  DATE OF SERVICE:  December 8, 2022    START TIME:  5:30pm    END TIME:  7:10pm    Group Length:   100 minutes    FACILITATOR(S):    Jeni BRIZUELA [facility]    TOPIC: BEH Group Therapy, Problem Gambling Group     Number of patients attending the group: 5    Group Therapy Type:  Problem Gambling Group    Group Attendance:  Client attended group     Summary of Group / Topics Discussed:   Group started with a speaker from [employer]. Speaker shared his story and gave group members information about GA and [employer] for friends and family and how GA sponsorship works. Patients took turns asking questions of speaker. After the speaker left the group the new member to our group was introduced and each group members took turns introducing themselves and sharing about their journey with compulsive gambling. Group members took turns with check in and identifying two feelings. Writer shared with group the [employer] website. Sharing all the  information that could be found including the twenty questions, the history of GA, how to find a meeting and the online store.      Patient's response to the group topic/interactions:   Patient appeared to gain a better understanding about gamblers anonymous.       Client specific details:    Patient was an active member in group and discussions. Patient shared with group his struggle with the upcoming holidays and having to be around family some of who know he is in treatment and some who do not.     Jeni Schmitz LADC, CGC

## 2022-12-09 NOTE — PROGRESS NOTES
"Patient:  Anastacio Middleton                         Date of Assessment: 10/26/2022            Problem Gambling Progress Note and Treatment Plan Review     Attendance  Group/Individual: Phase I      Groups Attended: 12/06/22, 12/08/22 and individual session on 12/07/22    Support group attended this week: no    Reporting sobriety:  yes    Client Treatment Goals:   1. Rebuilding family relationships   2. Learning coping skills so not to relapse   3. Feeling better emotionally from what s happened, feeling more like myself\"         Treatment Plan     Treatment Plan Review competed on: December 9, 2022    Staff Members contributing:  Clotilde Fox, [facility], [facility] & CHATA Curry, [facility]                         Received Supervision:  yes    Client: contributed to goals and plan.  Client received copy of plan/revised plan: Yes  Client agrees with plan/revised plan: Yes    Changes to Treatment Plan: No  New Goals added since last review:  No additional goals added at this time.      Goals worked on since last review:  Dimension 1 No reports of gambling currently.   Dimension 2 No changes reported.   Dimension 3 Patient attended group and added to discussion on topic of self-compassion. Patient reports referral to couples counseling to [facility] in their mental health department.   Dimension 4 Patient participated in group discussions.   Dimension 5 Patient appear to be working on strengthening his recovery currently.   Dimension 6 Patient attended group with speaker from [employer].    Strategies effective: yes    Strategies need these changes:   Build on supports in community and attain an individual mental health therapist.        Depression and Anxiety at Intake:   Patient's PHQ-9 was 6 out of 27, indicating mild depression.  Patient's LANA-7 score was 9 out of 21, indicating mild anxiety.         Intake Dimension Ratings:    Dimension 1  0    Dimension 2  1    Dimension 3  1    Dimension 4  0    Dimension 5  " 2    Dimension 6  1      Guide to Risk Ratings for Suicidality:   IDEATION: Active thoughts of suicide? INTENT: Intent to follow on suicide? PLAN: Plan to follow through on suicide? Level of Risk:   IF Yes Yes Yes Patient = High Emergent   IF Yes Yes No Patient = High Urgent/Non-Emergent   IF Yes No No Patient = Moderate Non-Urgent   IF No No   No Patient = Low Risk   The patient's ADDITIONAL RISK FACTORS and lack of PROTECTIVE FACTORS may increase their overall suicide risk ratings.     Patient's/client's current risk rating:  Low Risk    Safety Plan on File  No risk identified    Family Involvement:   none schedule this week    Data:   client did actively participate      Intervention:   Education  Twelve Step facilitation      Assessment:   Stages of Change Model  Action    Appears/Sounds:  Motivated  Engaged      Plan:  Focus on recovery environment  Monitor emotional/physical health    Jeni BRIZUELA, CGC

## 2022-12-09 NOTE — ADDENDUM NOTE
Encounter addended by: Jeni Schmitz [facility] on: 12/9/2022 8:15 AM   Actions taken: Clinical Note Signed

## 2022-12-13 ENCOUNTER — HOSPITAL ENCOUNTER (OUTPATIENT)
Dept: BEHAVIORAL HEALTH | Facility: CLINIC | Age: 32
Discharge: HOME | End: 2022-12-13
Attending: FAMILY MEDICINE
Payer: COMMERCIAL

## 2022-12-13 PROCEDURE — 90853 GROUP PSYCHOTHERAPY: CPT | Mod: GT,95

## 2022-12-14 ENCOUNTER — HOSPITAL ENCOUNTER (OUTPATIENT)
Dept: BEHAVIORAL HEALTH | Facility: CLINIC | Age: 32
Discharge: HOME | End: 2022-12-14
Attending: FAMILY MEDICINE
Payer: COMMERCIAL

## 2022-12-14 PROCEDURE — 90832 PSYTX W PT 30 MINUTES: CPT | Mod: GT,95

## 2022-12-14 NOTE — PROGRESS NOTES
Individual counseling session:    Telemedicine Visit: The patient's condition can be safely assessed and treated via synchronous audio and visual telemedicine encounter.      Reason for Telemedicine Visit: Patient has requested telehealth visit    Originating Site (Patient Location): Patient's home    Distant Site (Provider Location): Provider Remote Setting- Home Office    Consent:  The patient/guardian has verbally consented to: the potential risks and benefits of telemedicine (video visit) versus in person care; bill my insurance or make self-payment for services provided; and responsibility for payment of non-covered services.     Mode of Communication:  Video Conference via [employer]    As the provider I attest to compliance with applicable laws and regulations related to telemedicine.    Counselor verified Patient with 2-point verification: Patient is known to provider.    Video visit start time: 2:00pm  Video visit end time: 2:24pm    Length of session: 24 minutes    D: Writer and patient met for a scheduled individual session. Writer and patient discussed home work assigned from last individual session, worksheets on communication: Passive, Aggressive and Assertive Communication, Reflective Communication and Active Listening. Patient shared he will keep these worksheets and refer to them once a week to help him learn how to use these new skills.   I: Counselor facilitated 1:1 session.  A: Patient appeared to understand new ways of healthy communication.  P: Patient will choose a worksheet from program folder to complete for our next individual session on Wednesday 12/21/22 at 2pm.       Jeni BRIZUELA, [facility]

## 2022-12-14 NOTE — PROGRESS NOTES
"Group Therapy Documentation     Was the patient seen in-person or via Telemedicine (if in-person skip to Group Note): Telemedicine    If Telemedicine: The patient's condition can be safely assessed and treated via synchronous audio and visual telemedicine encounter. ? ?      Reason for Telemedicine Visit: Patient has requested telehealth visit    Originating Site (Patient Location): Patient's home    Distant Site (Provider Location): Provider Remote Setting- Home Office    Consent: ?The patient/guardian has verbally consented to: the potential risks and benefits of telemedicine (video visit) versus in person care; bill my insurance or make self-payment for services provided; and responsibility for payment of non-covered services.       Mode of Communication:??Video Conference via Zoom      As the provider I attest to compliance with applicable laws and regulations related to telemedicine.        Patient attended a video group session on the following days: ?          GROUP NOTE:  DATE OF SERVICE:  December 13, 2022    START TIME:  5:30pm    END TIME:  7:15pm    Group Length:   105 minutes     FACILITATOR(S):    Jeni BRIZUELA [facility]    TOPIC: BEH Group Therapy, Problem Gambling Group     Number of patients attending the group: 8    Group Therapy Type:  Problem Gambling Group    Group Attendance:  Client attended group     Summary of Group / Topics Discussed:   Group started with the welcome to our new group member. Each patient took turns introducing themselves and then the new group member shared a little about their story and what brought them to our program. Patient's took turns with check in and sharing two feelings/emotions. Writer shared a video from [employer] \"15 Ways to Reduce Screen Time\". After the video a discussion was held on screen time and writer shared instructions on how to access screen time/digital wellbeing times on their phones. How many times the phone was unlocked in a day, how much times was spent " on the phone and what apps were used. Writer asked patients to keep a log of how much screen time they had for one day, including work computer, TV, tablets. Patients to share this information on Thursday if they want to.      Patient's response to the group topic/interactions:   Patient appeared to gain more understanding of each other and what options they have to reduce their screen times.       Client specific details:    Patient shared with group the frustrations of trying to find a couples counselor that is either closer to home or virtual. Patient reports he and wife will continue to look for one that fits these parameters.      Jeni BRIZUELA, CGC

## 2022-12-15 ENCOUNTER — HOSPITAL ENCOUNTER (OUTPATIENT)
Dept: BEHAVIORAL HEALTH | Facility: CLINIC | Age: 32
Discharge: HOME | End: 2022-12-15
Attending: FAMILY MEDICINE
Payer: COMMERCIAL

## 2022-12-15 PROCEDURE — 90853 GROUP PSYCHOTHERAPY: CPT | Mod: GT,95

## 2022-12-16 NOTE — PROGRESS NOTES
Group Therapy Documentation     Was the patient seen in-person or via Telemedicine (if in-person skip to Group Note): Telemedicine    If Telemedicine: The patient's condition can be safely assessed and treated via synchronous audio and visual telemedicine encounter. ? ?      Reason for Telemedicine Visit: Patient has requested telehealth visit    Originating Site (Patient Location): Patient's home    Distant Site (Provider Location): Provider Remote Setting- Home Office    Consent: ?The patient/guardian has verbally consented to: the potential risks and benefits of telemedicine (video visit) versus in person care; bill my insurance or make self-payment for services provided; and responsibility for payment of non-covered services.       Mode of Communication:??Video Conference via Zoom      As the provider I attest to compliance with applicable laws and regulations related to telemedicine.        Patient attended a video group session on the following days: ?          GROUP NOTE:  DATE OF SERVICE:  December 15, 2022    START TIME:  5:30pm    END TIME:  7:01pm    Group Length:   91 Minutes    FACILITATOR(S):    Jeni BRIZUELA [facility]    TOPIC: BEH Group Therapy, Problem Gambling Group     Number of patients attending the group: 6    Group Therapy Type:  Problem Gambling Group    Group Attendance:  Client attended group     Summary of Group / Topics Discussed:   Group started with everyone taking turns with check in and identifying two feelings/emotions. A YouTube video was shown :Wheel of Life. Discussion was help on how to complete a Life Balance Wheel, with added discussion from patient who previously completed one. Writer shared with group two different lists of Coping Skills. One showing 99 Coping Skills, one showing examples of skills that can be used for grounding, distraction, emotional release, self love, thought challenge and accessing your higher self. Discussion was held on which coping skills each group member  could use. Group ended with a mindfulness reading/exercise.      Patient's response to the group topic/interactions:   Patient appeared to gain more awareness on completing a Life Balance Wheel and identifying coping skills that can be used in the future.       Client specific details:   Patient shared with group his current stress with work and home life. Patient added to discussion and was fully present for group.       Jeni BRIZUELA, CGC

## 2022-12-19 NOTE — ADDENDUM NOTE
Encounter addended by: Jeni Schmitz [facility] on: 12/19/2022 10:41 AM   Actions taken: Clinical Note Signed

## 2022-12-19 NOTE — PROGRESS NOTES
"Patient:  Anastacio Middleton                         Date of Assessment: 10/26/2022            Problem Gambling Progress Note and Treatment Plan Review     Attendance  Group/Individual: Phase I      Groups Attended: 12/13/22, 12/15/22 and individual session on 12/14/22    Support group attended this week: no    Reporting sobriety:  yes    Client Treatment Goals:   1. Rebuilding family relationships   2. Learning coping skills so not to relapse   3. Feeling better emotionally from what s happened, feeling more like myself\"         Treatment Plan     Treatment Plan Review competed on: December 19, 2022    Staff Members contributing:  Clotilde Fox, [facility], [facility] & TREY Curry, [facility]                         Received Supervision:  yes    Client: contributed to goals and plan.  Client received copy of plan/revised plan: Yes  Client agrees with plan/revised plan: Yes    Changes to Treatment Plan: No  New Goals added since last review:  No additional goals added at this time.      Goals worked on since last review:  Dimension 1 No reports of gambling.   Dimension 2 Patient reports ability to navigate the healthcare system as needed.   Dimension 3 Patient reports he and wife are now looking for couples counseling on their own biased on their insurance coverage.   Dimension 4 Patient attends group and patient participates in discussion in group.   Dimension 5 Patient continues to strengthen recovery and work on treatment plan goals.    Dimension 6 Patient attended group with topics: Life Balance Wheel and Ways to Reduce Screen Time.     Strategies effective: yes    Strategies need these changes:   Build on supports in community and attain an individual mental health therapist.        Depression and Anxiety at Intake:   Patient's PHQ-9 was 6 out of 27, indicating mild depression.  Patient's LANA-7 score was 9 out of 21, indicating mild anxiety.      Intake Dimension Ratings:    Dimension 1  0    Dimension 2  1    Dimension 3  " 1    Dimension 4  0    Dimension 5  2    Dimension 6  1      Guide to Risk Ratings for Suicidality:   IDEATION: Active thoughts of suicide? INTENT: Intent to follow on suicide? PLAN: Plan to follow through on suicide? Level of Risk:   IF Yes Yes Yes Patient = High Emergent   IF Yes Yes No Patient = High Urgent/Non-Emergent   IF Yes No No Patient = Moderate Non-Urgent   IF No No   No Patient = Low Risk   The patient's ADDITIONAL RISK FACTORS and lack of PROTECTIVE FACTORS may increase their overall suicide risk ratings.     Patient's/client's current risk rating:  Low Risk    Safety Plan on File  No risk identified    Family Involvement:   none schedule this week    Data:   good insight client did participate      Intervention:   Education  Group feedback      Assessment:   Stages of Change Model  Action    Appears/Sounds:  Motivated      Plan:  Focus on recovery environment  Monitor emotional/physical health      Jeni BRIZUELA, CGC

## 2022-12-20 ENCOUNTER — HOSPITAL ENCOUNTER (OUTPATIENT)
Dept: BEHAVIORAL HEALTH | Facility: CLINIC | Age: 32
Discharge: HOME | End: 2022-12-20
Attending: FAMILY MEDICINE
Payer: COMMERCIAL

## 2022-12-20 PROCEDURE — 90853 GROUP PSYCHOTHERAPY: CPT | Mod: GT,95

## 2022-12-21 ENCOUNTER — HOSPITAL ENCOUNTER (OUTPATIENT)
Dept: BEHAVIORAL HEALTH | Facility: CLINIC | Age: 32
Discharge: HOME | End: 2022-12-21
Attending: FAMILY MEDICINE
Payer: COMMERCIAL

## 2022-12-21 PROCEDURE — 90832 PSYTX W PT 30 MINUTES: CPT | Mod: GT,95

## 2022-12-21 NOTE — PROGRESS NOTES
Individual counseling session:    Telemedicine Visit: The patient's condition can be safely assessed and treated via synchronous audio and visual telemedicine encounter.      Reason for Telemedicine Visit: Patient has requested telehealth visit    Originating Site (Patient Location): Patient's other Parking lot of [address] [place] [address]    Distant Site (Provider Location): Provider Remote Setting- Home Office    Consent:  The patient/guardian has verbally consented to: the potential risks and benefits of telemedicine (video visit) versus in person care; bill my insurance or make self-payment for services provided; and responsibility for payment of non-covered services.     Mode of Communication:  Video Conference via [employer]    As the provider I attest to compliance with applicable laws and regulations related to telemedicine.    Counselor verified Patient with 2-point verification: Patient is known to provider.    Video visit start time: 2:00pm  Video visit end time: 2:16pm    Length of session: 16 minutes    D: Writer and patient met for scheduled individual session via [employer]. Patient advised writer he was in his car in the parking lot of the [facility] in [facility]. Patient did not complete home work but will work on worksheet from packet/folder: Family, for our next individual session. Patient shared he continues to work on communication with wife. Writer suggested showing wife sheet in packet/folder: A Chart of Compulsive Gambling and Recovery to wife and to discuss where they both have been on the path and where they are now. Patient shared with writer he might not be able to attend Thursdays group due to family gathering.   I: Counselor facilitated 1:1 session.  A: Patient appeared to continue to work on treatment goals.   P: Patient will attend both groups next week, Tuesday and Thursday. Patient will complete worksheet: Family for next individual session on Wednesday  January 4th at 2:00pm. Patient will attend hybrid group in-person on Thursday January 5th.       Jeni BRIZUELA, CGC

## 2022-12-21 NOTE — PROGRESS NOTES
Group Therapy Documentation     Was the patient seen in-person or via Telemedicine (if in-person skip to Group Note): Telemedicine    If Telemedicine: The patient's condition can be safely assessed and treated via synchronous audio and visual telemedicine encounter. ? ?      Reason for Telemedicine Visit: Patient has requested telehealth visit    Originating Site (Patient Location): Patient's home    Distant Site (Provider Location): Provider Remote Setting- Home Office    Consent: ?The patient/guardian has verbally consented to: the potential risks and benefits of telemedicine (video visit) versus in person care; bill my insurance or make self-payment for services provided; and responsibility for payment of non-covered services.       Mode of Communication:??Video Conference via Zoom      As the provider I attest to compliance with applicable laws and regulations related to telemedicine.        Patient attended a video group session on the following days: ?          GROUP NOTE:  DATE OF SERVICE:  December 20, 2022    START TIME:  5:30pm    END TIME:  7:21pm    Group Length:   111 minutes    FACILITATOR(S):    Jeni BRIZUELA CGC    TOPIC: BEH Group Therapy, Problem Gambling Group     Number of patients attending the group: 5    Group Therapy Type:  Problem Gambling Group    Group Attendance:  Client attended group     Summary of Group / Topics Discussed:   Group started with each patient taking turns for check in and identifying two feelings. Follow up discussion was held on topics from last week: Reducing screen time, Coping Skills and mindful eating. Some patients shared their upcoming stressors for the holiday season and discussing ways to deal with gambling during the holiday celebrations and family get togethers. Writer and patients went over the workbook: A Guide to Managing Stress. Learning and discussing different ways to identify stress and techniques to deal with stress. Group ended with a mindfulness  reading.      Patient's response to the group topic/interactions:    Patient appeared to gain more understanding of identifying and ways to deal with stress.       Client specific details:  Patient shared with group his excitement for taking time off from work for the holiday. Patient also shared added stressors of having family stay at his home for the holidays.       Jeni BRIZUELA, cGC

## 2022-12-22 ENCOUNTER — TELEPHONE (OUTPATIENT)
Dept: BEHAVIORAL HEALTH | Facility: CLINIC | Age: 32
End: 2022-12-22

## 2022-12-23 NOTE — TELEPHONE ENCOUNTER
During individual session yesterday 12/21, patient shared he might not be able to attend group tonight. Patient did not attend and this was an excused absence.

## 2022-12-27 ENCOUNTER — HOSPITAL ENCOUNTER (OUTPATIENT)
Dept: BEHAVIORAL HEALTH | Facility: CLINIC | Age: 32
Discharge: HOME | End: 2022-12-27
Attending: FAMILY MEDICINE
Payer: COMMERCIAL

## 2022-12-27 PROCEDURE — 90853 GROUP PSYCHOTHERAPY: CPT | Mod: GT,95

## 2022-12-28 ENCOUNTER — TELEPHONE (OUTPATIENT)
Dept: BEHAVIORAL HEALTH | Facility: CLINIC | Age: 32
End: 2022-12-28

## 2022-12-28 NOTE — TELEPHONE ENCOUNTER
Call and text from patient on my personal phone. Patient wanted to cancel our appointment for today at 2pm. Text back to patient saying I will cancel appointment once I started work for the day. Email to patient advising him we did not have appointment set up for today but we do have one for next Wednesday.

## 2022-12-28 NOTE — PROGRESS NOTES
"Group Therapy Documentation     Was the patient seen in-person or via Telemedicine (if in-person skip to Group Note): Telemedicine    If Telemedicine: The patient's condition can be safely assessed and treated via synchronous audio and visual telemedicine encounter. ? ?      Reason for Telemedicine Visit: Patient has requested telehealth visit    Originating Site (Patient Location): Patient's home    Distant Site (Provider Location): Provider Remote Setting- Home Office    Consent: ?The patient/guardian has verbally consented to: the potential risks and benefits of telemedicine (video visit) versus in person care; bill my insurance or make self-payment for services provided; and responsibility for payment of non-covered services.       Mode of Communication:??Video Conference via Zoom      As the provider I attest to compliance with applicable laws and regulations related to telemedicine.        Patient attended a video group session on the following days: ?          GROUP NOTE:  DATE OF SERVICE:  December 27, 2022    START TIME:  5:30pm    END TIME:  7:25 PM    Group Length:   115 minutes    FACILITATOR(S):    CHATA Perkins, [facility]    TOPIC: BEH Group Therapy, Problem Gambling Group     Number of patients attending the group: 9    Group Therapy Type:  Problem Gambling Group    Group Attendance:  Client attended group     Summary of Group / Topics Discussed: Group members checked in with events from the weekend and holidays as well as introductions to two new group members.  Group discussed stressors and stress management strategies used by group members.  Group discussion was held on ways to express and receiving gratitude as well as connection with others, including strengthening relationships and trust and happiness.  Group watched short video \"[place] in Happiness\" with discussion to follow.  Group closed with a reading.     Patient's response to the group topic/interactions:  PT appeared to gain " insight into the benefits of expressing gratitude in recovery.      Client specific details:   PT shared his expression of gratitude to others, including his wife.  PT was an active participant in group and treatment processes.     CHATA Perkins, CGC   Eye Protection Verbiage: Before proceeding with the stage, a plastic scleral shield was inserted. The globe was anesthetized with a few drops of 1% lidocaine with 1:100,000 epinephrine. Then, an appropriate sized scleral shield was chosen and coated with lacrilube ointment. The shield was gently inserted and left in place for the duration of each stage. After the stage was completed, the shield was gently removed.

## 2022-12-29 ENCOUNTER — HOSPITAL ENCOUNTER (OUTPATIENT)
Dept: BEHAVIORAL HEALTH | Facility: CLINIC | Age: 32
Discharge: HOME | End: 2022-12-29
Attending: FAMILY MEDICINE
Payer: COMMERCIAL

## 2022-12-29 PROCEDURE — 90853 GROUP PSYCHOTHERAPY: CPT | Mod: GT,95

## 2022-12-30 NOTE — PROGRESS NOTES
Group Therapy Documentation     Was the patient seen in-person or via Telemedicine (if in-person skip to Group Note): Telemedicine    If Telemedicine: The patient's condition can be safely assessed and treated via synchronous audio and visual telemedicine encounter. ? ?      Reason for Telemedicine Visit: Patient has requested telehealth visit    Originating Site (Patient Location): Patient's home    Distant Site (Provider Location): Provider Remote Setting- Home Office    Consent: ?The patient/guardian has verbally consented to: the potential risks and benefits of telemedicine (video visit) versus in person care; bill my insurance or make self-payment for services provided; and responsibility for payment of non-covered services.       Mode of Communication:??Video Conference via Zoom      As the provider I attest to compliance with applicable laws and regulations related to telemedicine.        Patient attended a video group session on the following days: ?          GROUP NOTE:  DATE OF SERVICE:  December 29, 2022    START TIME:  5:30pm    END TIME:  7:20pm    Group Length:   110 minutes    FACILITATOR(S):    Jeni BRIZUELA CGC    TOPIC: BEH Group Therapy, Problem Gambling Group     Number of patients attending the group: 9    Group Therapy Type:  Problem Gambling Group    Group Attendance:  Client attended group     Summary of Group / Topics Discussed:   Group started with announcements/reminders: Hybrid group will be next Thursday in-person and virtual, more information will be emailed out at the beginning of next week. Writer and group went over updated group rules, including the absence policy. Writer shared with group The Calm héctor and Hallow héctor. Group members took turns with check in and identifying two feelings. Group discussion was held on Relapse: identifying warning signs, identifying the problem, finding tools and coping skills to deal with warning signs. Group ended with a mindfulness task reading.       Patient's response to the group topic/interactions:    Patient appeared to gain knowledge and awareness of Relapse warning signs and healthy ways to deal with them.       Client specific details:    Patient shared with group some stressful situations he is dealing with within his family. Patient shared he continues to work on strengthening his communication skills.       Jeni BRIZUELA, [facility]

## 2022-12-30 NOTE — ADDENDUM NOTE
Encounter addended by: Jeni Schmitz [facility] on: 12/30/2022 9:52 AM   Actions taken: Clinical Note Signed

## 2022-12-30 NOTE — PROGRESS NOTES
"Patient:  Anastacio Middleton                         Date of Assessment: 10/26/2022            Problem Gambling Progress Note and Treatment Plan Review     Attendance  Group/Individual: Phase I      Groups Attended: 12/27/22, 12/29/22    Support group attended this week: no    Reporting sobriety:  yes    Client Treatment Goals:   1. Rebuilding family relationships   2. Learning coping skills so not to relapse   3. Feeling better emotionally from what s happened, feeling more like myself\"         Treatment Plan     Treatment Plan Review competed on: December 30, 2022    Staff Members contributing:  Clotilde Fox, [facility], [facility] & TREY Curry, [facility]                         Received Supervision:  yes    Client: contributed to goals and plan.  Client received copy of plan/revised plan: Yes  Client agrees with plan/revised plan: Yes    Changes to Treatment Plan: Yes  New Goals added since last review:  No additional goals added at this time.    Goals worked on since last review:  Dimension 1 No reported gambling.   Dimension 2 No reported change in health.   Dimension 3 Patient reports working on emotion identification.   Dimension 4 Patient attended group with topic: expressing and receiving gratitude.   Dimension 5 Patient attended groups with topics: stressors and stress management strategies and Relapse: identifying warning signs, identifying the problem, finding tools and coping skills to deal with warning signs  Dimension 6 Patient continues to strengthen supports with family and friends.     Strategies effective: yes    Strategies need these changes:   Build on supports in community and attain an individual mental health therapist       Depression and Anxiety at Intake:   Patient's PHQ-9 was 6 out of 27, indicating mild depression.  Patient's LANA-7 score was 9 out of 21, indicating mild anxiety.      Intake Dimension Ratings:    Dimension 1  0    Dimension 2  1    Dimension 3  1    Dimension 4  0    Dimension 5  " 2    Dimension 6  1      Guide to Risk Ratings for Suicidality:   IDEATION: Active thoughts of suicide? INTENT: Intent to follow on suicide? PLAN: Plan to follow through on suicide? Level of Risk:   IF Yes Yes Yes Patient = High Emergent   IF Yes Yes No Patient = High Urgent/Non-Emergent   IF Yes No No Patient = Moderate Non-Urgent   IF No No   No Patient = Low Risk   The patient's ADDITIONAL RISK FACTORS and lack of PROTECTIVE FACTORS may increase their overall suicide risk ratings.     Patient's/client's current risk rating:  Low Risk    Safety Plan on File  No risk identified    Family Involvement:   none schedule this week    Data:   client did participate      Intervention:   Education  Group feedback      Assessment:   Stages of Change Model  Action    Appears/Sounds:  Engaged      Plan:  Focus on recovery environment  Monitor emotional/physical health      Jeni BRIZUELA, CGC

## 2023-01-03 ENCOUNTER — HOSPITAL ENCOUNTER (OUTPATIENT)
Dept: BEHAVIORAL HEALTH | Facility: CLINIC | Age: 33
Discharge: HOME | End: 2023-01-03
Attending: FAMILY MEDICINE
Payer: COMMERCIAL

## 2023-01-03 PROCEDURE — 90853 GROUP PSYCHOTHERAPY: CPT | Mod: GT,95

## 2023-01-04 ENCOUNTER — HOSPITAL ENCOUNTER (OUTPATIENT)
Dept: BEHAVIORAL HEALTH | Facility: CLINIC | Age: 33
Discharge: HOME | End: 2023-01-04
Attending: FAMILY MEDICINE
Payer: COMMERCIAL

## 2023-01-04 PROCEDURE — 90832 PSYTX W PT 30 MINUTES: CPT | Mod: GT,95

## 2023-01-04 NOTE — PROGRESS NOTES
Group Therapy Documentation     Was the patient seen in-person or via Telemedicine (if in-person skip to Group Note): Telemedicine    If Telemedicine: The patient's condition can be safely assessed and treated via synchronous audio and visual telemedicine encounter. ? ?      Reason for Telemedicine Visit: Patient has requested telehealth visit    Originating Site (Patient Location): Patient's home    Distant Site (Provider Location): Provider Remote Setting- Home Office    Consent: ?The patient/guardian has verbally consented to: the potential risks and benefits of telemedicine (video visit) versus in person care; bill my insurance or make self-payment for services provided; and responsibility for payment of non-covered services.       Mode of Communication:??Video Conference via Zoom      As the provider I attest to compliance with applicable laws and regulations related to telemedicine.        Patient attended a video group session on the following days: ?          GROUP NOTE:  DATE OF SERVICE:  January 3, 2023    START TIME:  5:30pm    END TIME:  7:20pm    Group Length:   110 minutes    FACILITATOR(S):    Jeni BRIZUELA CGC    TOPIC: BEH Group Therapy, Problem Gambling Group     Number of patients attending the group: 9    Group Therapy Type:  Problem Gambling Group    Group Attendance:  Client attended group     Summary of Group / Topics Discussed:   Group started with each person taking turns for check in and identifying two feelings. The group completed last week s online handouts on topics of Triggers and Relapse, with sharing personal stories and discussion. Patient's watched a short video: Understand and Manage Your Monkey Mind - Anxiety, Anger, Depression explained, follow by discussion. Group ended with a mindful reading/task: Listen to a Song.      Patient's response to the group topic/interactions:    Patient appeared to identify triggers and paths to relapse.       Client specific details:     Patient  shared with the group how he continues to work on communication skills with wife. Patient shared he and wife will be attending couples counseling at [facility] in [place].       Jeni Schmitz LADC,CGC

## 2023-01-04 NOTE — PROGRESS NOTES
"Individual counseling session:    Telemedicine Visit: The patient's condition can be safely assessed and treated via synchronous audio and visual telemedicine encounter.      Reason for Telemedicine Visit: Patient has requested telehealth visit    Originating Site (Patient Location): Patient's place of employment [address]     Distant Site (Provider Location): Provider Remote Setting- Home Office    Consent:  The patient/guardian has verbally consented to: the potential risks and benefits of telemedicine (video visit) versus in person care; bill my insurance or make self-payment for services provided; and responsibility for payment of non-covered services.     Mode of Communication:  Video Conference via [employer]    As the provider I attest to compliance with applicable laws and regulations related to telemedicine.    Counselor verified Patient with 2-point verification: Patient is known to provider.    Video visit start time: 2:00pm  Video visit end time: 2:35pm    Length of session: 35 minutes    D: Writer and patient met for a scheduled virtual individual session via [employer]. Patient is located at work for this session. Patient shared with writer his completed assignment on Family: Identifying gambling behaviors that have harmed their family, Identifiying family roles and how this person interacts with the family and the addict and information about enabling. Patient identified himself as the \"addicted person\", the \"forgotten child\" and as an enabler to spouse. Patient shared he is completing the intake forms for couples counseling with [facility] in [place]. Once these are complete patient and spouse will be able to meet with a counselor in the next couple of weeks.   I: Counselor facilitated 1:1 session.  A: Patient appears to have a better understanding of his and others roles in family dynamics.   P: Patient to complete intake forms for couples counseling. Patient to continue " with weekly individual session, next session 01/11/23 at 2pm. Patient to continue to attend Tuesday and Thursday evening group sessions.       Jeni BRIZUELA, CGC

## 2023-01-05 ENCOUNTER — HOSPITAL ENCOUNTER (OUTPATIENT)
Dept: BEHAVIORAL HEALTH | Facility: CLINIC | Age: 33
Discharge: HOME | End: 2023-01-05
Attending: FAMILY MEDICINE
Payer: COMMERCIAL

## 2023-01-05 PROCEDURE — 90853 GROUP PSYCHOTHERAPY: CPT | Mod: GT,95

## 2023-01-06 NOTE — PROGRESS NOTES
Group Therapy Documentation     Was the patient seen in-person or via Telemedicine (if in-person skip to Group Note): Telemedicine    If Telemedicine: The patient's condition can be safely assessed and treated via synchronous audio and visual telemedicine encounter. ? ?      Reason for Telemedicine Visit: Patient has requested telehealth visit    Originating Site (Patient Location): Patient's home    Distant Site (Provider Location): Provider Remote Setting- Home Office    Consent: ?The patient/guardian has verbally consented to: the potential risks and benefits of telemedicine (video visit) versus in person care; bill my insurance or make self-payment for services provided; and responsibility for payment of non-covered services.       Mode of Communication:??Video Conference via Zoom      As the provider I attest to compliance with applicable laws and regulations related to telemedicine.        Patient attended a video group session on the following days: ?          GROUP NOTE:  DATE OF SERVICE:  January 5, 2023    START TIME:  5:30pm    END TIME:  7:21pm    Group Length:   111 minutes    FACILITATOR(S):    Jeni BRIZUELA CGC     TOPIC: BEH Group Therapy, Problem Gambling Group     Number of patients attending the group: 9    Group Therapy Type:  Problem Gambling Group    Group Attendance:  Client attended group     Summary of Group / Topics Discussed:   Group started with a recap from Tuesday group and last night s Family group. Group members watched the short videos: Monkey Mind: Anxiety, Anger, Depression Explained episodes 2 and 3 followed by discussion on the importance of sleep, owning emotions and accepting emotions. Writer shared current emotions list and three new one s group members can reference. Writer and group members went through hand outs and continued with discussion on feelings and emotions. All connecting with Dimension III.     Patient's response to the group topic/interactions:    Patient  appeared to gain more understanding of identifying emotions.       Client specific details:     Patient shared with group emotions he felt before and after gambling became out of control. Patient also shared a time when he felt gulity.       Jeni Schmitz LADJUAN, CGC

## 2023-01-06 NOTE — ADDENDUM NOTE
Encounter addended by: Jeni Schmitz [facility] on: 1/6/2023 9:02 AM   Actions taken: Clinical Note Signed

## 2023-01-06 NOTE — PROGRESS NOTES
"Patient:  Anastacio Middleton                         Date of Assessment: 10/26/2022            Problem Gambling Progress Note and Treatment Plan Review     Attendance  Group/Individual: Phase I      Groups Attended: 01/03/23, 01/05/23 and individual session on 01/04/23    Support group attended this week: no    Reporting sobriety:  yes    Client Treatment Goals:   1. Rebuilding family relationships   2. Learning coping skills so not to relapse   3. Feeling better emotionally from what s happened, feeling more like myself\"         Treatment Plan     Treatment Plan Review competed on: January 6, 2023    Staff Members contributing:  Clotilde Fox, [facility] [place] & Jeni Schmitz [facility] [place]                         Received Supervision:  yes    Client: contributed to goals and plan.  Client received copy of plan/revised plan: Yes  Client agrees with plan/revised plan: Yes    Changes to Treatment Plan: No  New Goals added since last review:  No additional goals added at this time.    Goals worked on since last review:  Dimension 1 No reported gambling.   Dimension 2 No reported changes in health.   Dimension 3 Patient attended groups with topics and videos on: Understand and Manage Your Monkey Mind - Anxiety, Anger, Depression explained Parts 1-3. Different emotion lists and feelings wheel.   Dimension 4 Patient continues to attend groups and individual sessions weekly.   Dimension 5 Patient attended group with topics: Triggers and Relapse. Sharing his recovery with others.   Dimension 6 Patient shared he will be starting couples counseling soon.     Strategies effective: yes    Strategies need these changes:   Build on supports in community and attain an individual mental health therapist        Depression and Anxiety at Intake:   Patient's PHQ-9 was 6 out of 27, indicating mild depression.  Patient's LANA-7 score was 9 out of 21, indicating mild anxiety.     Intake Dimension Ratings:    Dimension 1  0    Dimension 2  1    " Dimension 3  1    Dimension 4  0    Dimension 5  2    Dimension 6  1      Guide to Risk Ratings for Suicidality:   IDEATION: Active thoughts of suicide? INTENT: Intent to follow on suicide? PLAN: Plan to follow through on suicide? Level of Risk:   IF Yes Yes Yes Patient = High Emergent   IF Yes Yes No Patient = High Urgent/Non-Emergent   IF Yes No No Patient = Moderate Non-Urgent   IF No No   No Patient = Low Risk   The patient's ADDITIONAL RISK FACTORS and lack of PROTECTIVE FACTORS may increase their overall suicide risk ratings.     Patient's/client's current risk rating:  Low Risk    Safety Plan on File  No risk identified    Family Involvement:   none schedule this week    Data:   client did actively participate      Intervention:   Education      Assessment:   Stages of Change Model  Action    Appears/Sounds:  Cooperative  Engaged      Plan:  Focus on recovery environment  Monitor emotional/physical health      Jeni BRIZUELA, CGC

## 2023-01-08 ENCOUNTER — HEALTH MAINTENANCE LETTER (OUTPATIENT)
Age: 33
End: 2023-01-08

## 2023-01-10 ENCOUNTER — HOSPITAL ENCOUNTER (OUTPATIENT)
Dept: BEHAVIORAL HEALTH | Facility: CLINIC | Age: 33
Discharge: HOME | End: 2023-01-10
Attending: FAMILY MEDICINE
Payer: COMMERCIAL

## 2023-01-10 PROCEDURE — 90853 GROUP PSYCHOTHERAPY: CPT | Mod: GT,95

## 2023-01-11 ENCOUNTER — HOSPITAL ENCOUNTER (OUTPATIENT)
Dept: BEHAVIORAL HEALTH | Facility: CLINIC | Age: 33
Discharge: HOME | End: 2023-01-11
Attending: FAMILY MEDICINE
Payer: COMMERCIAL

## 2023-01-11 PROCEDURE — 90832 PSYTX W PT 30 MINUTES: CPT | Mod: GT,95

## 2023-01-11 NOTE — PROGRESS NOTES
"Group Therapy Documentation     Was the patient seen in-person or via Telemedicine (if in-person skip to Group Note): Telemedicine    If Telemedicine: The patient's condition can be safely assessed and treated via synchronous audio and visual telemedicine encounter. ? ?      Reason for Telemedicine Visit: Patient has requested telehealth visit    Originating Site (Patient Location): Patient's home    Distant Site (Provider Location): Provider Remote Setting- Home Office    Consent: ?The patient/guardian has verbally consented to: the potential risks and benefits of telemedicine (video visit) versus in person care; bill my insurance or make self-payment for services provided; and responsibility for payment of non-covered services.       Mode of Communication:??Video Conference via Zoom      As the provider I attest to compliance with applicable laws and regulations related to telemedicine.        Patient attended a video group session on the following days: ?          GROUP NOTE:  DATE OF SERVICE:  January 10, 2023    START TIME:  5:30pm    END TIME:  7:20pm    Group Length:   110 minutes    FACILITATOR(S):    Jeni BRIZUELA [facility] [place]    TOPIC: BEH Group Therapy, Problem Gambling Group     Number of patients attending the group: 10    Group Therapy Type:  Problem Gambling Group    Group Attendance:  Client attended group     Summary of Group / Topics Discussed:   Group started with each patient sharing a check in of the week and identifying two feelings. Group watched a short video on \"Post Secrets\". How people can share their secrets anonymously. This led into discussion about secrets and how learning about others brings connection for dimension 6. Group started on a discussion on Values for dimension 4 . Comparing the values, you had before gambling took over and the values you have now. How have your values changed? Group ended with a mindfulness reading/task to look outside and become aware of all the things you see. "      Patient's response to the group topic/interactions:    Patient appeared to gain awareness on connection and values.       Client specific details:    Patient shared he spoke to clinic to begin couples counseling and was told they do not have any openings right now but to call back next week. Patient shared finding new meditations on YouTube and also sharing this with his wife for her to listen to.       Jeni Schmitz LADC, CGC

## 2023-01-11 NOTE — PROGRESS NOTES
Individual counseling session:    Telemedicine Visit: The patient's condition can be safely assessed and treated via synchronous audio and visual telemedicine encounter.      Reason for Telemedicine Visit: Patient has requested telehealth visit    Originating Site (Patient Location): Patient's place of employment - [address] [place] [id number]    Distant Site (Provider Location): Provider Remote Setting- Home Office    Consent:  The patient/guardian has verbally consented to: the potential risks and benefits of telemedicine (video visit) versus in person care; bill my insurance or make self-payment for services provided; and responsibility for payment of non-covered services.     Mode of Communication:  Video Conference via [employer]    As the provider I attest to compliance with applicable laws and regulations related to telemedicine.    Counselor verified Patient with 2-point verification: Patient is known to provider.    Video visit start time: 2:00pm  Video visit end time: 2:30pm    Length of session: 30 minutes    D: Writer and patient met for scheduled individual session via [employer]. Patient shared now he and wife will be looking into another clinic for couples counseling. Writer and patient discussed current relationship stressors. Writer and patient went over topic of relationships: importance, trustworthiness, honesty and respectfulness. Patient identified ways to build and repair relationships. For next weeks individual session patient will complete worksheet on anger. Patient requested weekly individiual sessions be moved to Tuesdays at 2pm starting next week.   I: Counselor facilitated 1:1 session.  A: Patient appeared to gain more understanding of healthy relationships.   P: Patient and writer will meet for next individual session next Tuesday Jan 17th at 2pm. Patient will continue to attend group sessions on Tuesday and Thursdays.       Jeni BRIZUELA, [facility] [place]

## 2023-01-12 ENCOUNTER — HOSPITAL ENCOUNTER (OUTPATIENT)
Dept: BEHAVIORAL HEALTH | Facility: CLINIC | Age: 33
Discharge: HOME | End: 2023-01-12
Attending: FAMILY MEDICINE
Payer: COMMERCIAL

## 2023-01-12 PROCEDURE — 90853 GROUP PSYCHOTHERAPY: CPT | Mod: GT,95

## 2023-01-13 ENCOUNTER — IMMUNIZATION (OUTPATIENT)
Dept: NURSING | Facility: CLINIC | Age: 33
End: 2023-01-13
Payer: COMMERCIAL

## 2023-01-13 PROCEDURE — 90686 IIV4 VACC NO PRSV 0.5 ML IM: CPT

## 2023-01-13 PROCEDURE — 90471 IMMUNIZATION ADMIN: CPT

## 2023-01-13 PROCEDURE — 0124A COVID-19 VACCINE BIVALENT BOOSTER 12+ (PFIZER): CPT

## 2023-01-13 PROCEDURE — 91312 COVID-19 VACCINE BIVALENT BOOSTER 12+ (PFIZER): CPT

## 2023-01-13 NOTE — PROGRESS NOTES
"Group Therapy Documentation     Was the patient seen in-person or via Telemedicine (if in-person skip to Group Note): Telemedicine    If Telemedicine: The patient's condition can be safely assessed and treated via synchronous audio and visual telemedicine encounter. ? ?      Reason for Telemedicine Visit: Patient has requested telehealth visit    Originating Site (Patient Location): Patient's home    Distant Site (Provider Location): Provider Remote Setting- Home Office    Consent: ?The patient/guardian has verbally consented to: the potential risks and benefits of telemedicine (video visit) versus in person care; bill my insurance or make self-payment for services provided; and responsibility for payment of non-covered services.       Mode of Communication:??Video Conference via Zoom      As the provider I attest to compliance with applicable laws and regulations related to telemedicine.        Patient attended a video group session on the following days: ?          GROUP NOTE:  DATE OF SERVICE:  January 12, 2023    START TIME:  5:30pm    END TIME:  7:20pm    Group Length:   110 minutes    FACILITATOR(S):    Jeni BRIZUELA [facility] [place]    TOPIC: BEH Group Therapy, Problem Gambling Group     Number of patients attending the group: 8    Group Therapy Type:  Problem Gambling Group    Group Attendance:  Client attended group     Summary of Group / Topics Discussed:   Group started with a speaker from [employer]. Speaker shared his story and had discussion with group on many topics pertaining to compulsive gambling. Working on treatment goals from Dimension 6. Writer showed a short video to the group from an interview with the Nilda Barber asking him \"What is the cure for anxiety\". Group members participated in discussion on altruism. Sharing different ways, they have helped others and ways they can help in the future. Group ended with a mindfulness task reading.      Patient's response to the group topic/interactions:  "   Patient appeared to gain more awareness on long term recovery and altruism.       Client specific details:    Patient had limited participation in group. Patient had video off more than half of the group time.     Jeni BRIZUELA, [facility] [place]

## 2023-01-16 NOTE — PROGRESS NOTES
"Patient:  Anastacio Middleton                         Date of Assessment: 10/26/2022            Problem Gambling Progress Note and Treatment Plan Review     Attendance  Group/Individual: Phase I      Groups Attended: 01/10/23, 01/12/23 and individual session on 01/11/23    Support group attended this week: no    Reporting sobriety:  yes    Client Treatment Goals:   1. Rebuilding family relationships   2. Learning coping skills so not to relapse   3. Feeling better emotionally from what s happened, feeling more like myself\"         Treatment Plan     Treatment Plan Review competed on: January 16, 2023    Staff Members contributing:  Clotilde Fox, [facility], [facility] [place] & TREY Curry, [facility]                         Received Supervision:  yes    Client: contributed to goals and plan.  Client received copy of plan/revised plan: Yes  Client agrees with plan/revised plan: Yes    Changes to Treatment Plan: No  New Goals added since last review:  No additional goals added at this time.      Goals worked on since last review:  Dimension 1 No reports of gambling.   Dimension 2 Patient reports ability to navigate the healthcare system as needed.  Dimension 3 Patient continues to utilize emotions list and emotions chart for emotion identification. Patient has found YouTube meditations helpful.   Dimension 4 Patient attended group with topic: Values and added to discussion.   Dimension 5  Patient attended group with Video and discussion on \"Post Secrets\"  Dimension 6 Patient attended group with [place] speaker and discussion topic on Altruism. Patient continues to look into couples counseling.     Strategies effective: yes    Strategies need these changes:   Build on supports in community and attain an individual mental health therapist       Depression and Anxiety at Intake:   Patient's PHQ-9 was 6 out of 27, indicating mild depression.  Patient's LANA-7 score was 9 out of 21, indicating mild anxiety.        Intake Dimension Ratings:    " Dimension 1  0    Dimension 2  1    Dimension 3  1    Dimension 4  0    Dimension 5  2    Dimension 6  1      Guide to Risk Ratings for Suicidality:   IDEATION: Active thoughts of suicide? INTENT: Intent to follow on suicide? PLAN: Plan to follow through on suicide? Level of Risk:   IF Yes Yes Yes Patient = High Emergent   IF Yes Yes No Patient = High Urgent/Non-Emergent   IF Yes No No Patient = Moderate Non-Urgent   IF No No   No Patient = Low Risk   The patient's ADDITIONAL RISK FACTORS and lack of PROTECTIVE FACTORS may increase their overall suicide risk ratings.     Patient's/client's current risk rating:  Low Risk    Safety Plan on File  No risk identified    Family Involvement:   none schedule this week    Data:   client did actively participate      Intervention:   Counselor feedback  Education  Group feedback      Assessment:   Stages of Change Model  Action    Appears/Sounds:  Engaged      Plan:  Focus on recovery environment  Monitor emotional/physical health      Jeni BRIZUELA, CGC

## 2023-01-16 NOTE — ADDENDUM NOTE
Encounter addended by: Jeni Schmitz [facility] on: 1/16/2023 11:51 AM   Actions taken: Clinical Note Signed

## 2023-01-17 ENCOUNTER — HOSPITAL ENCOUNTER (OUTPATIENT)
Dept: BEHAVIORAL HEALTH | Facility: CLINIC | Age: 33
Discharge: HOME | End: 2023-01-17
Attending: FAMILY MEDICINE
Payer: COMMERCIAL

## 2023-01-17 PROCEDURE — 90832 PSYTX W PT 30 MINUTES: CPT | Mod: GT,95

## 2023-01-17 PROCEDURE — 90853 GROUP PSYCHOTHERAPY: CPT | Mod: GT,95

## 2023-01-17 NOTE — PROGRESS NOTES
Individual counseling session:    Telemedicine Visit: The patient's condition can be safely assessed and treated via synchronous audio and visual telemedicine encounter.      Reason for Telemedicine Visit: Services only offered telehealth    Originating Site (Patient Location): Patient's home    Distant Site (Provider Location): Provider Remote Setting- Home Office    Consent:  The patient/guardian has verbally consented to: the potential risks and benefits of telemedicine (video visit) versus in person care; bill my insurance or make self-payment for services provided; and responsibility for payment of non-covered services.     Mode of Communication:  Video Conference via [employer]    As the provider I attest to compliance with applicable laws and regulations related to telemedicine.    Counselor verified Patient with 2-point verification: Patient is known to provider.    Video visit start time: 2:00pm  Video visit end time: 2:31pm    Length of session: 31 minutes    D: Writer and patient met for a scheduled individual video session via [employer]. Writer and patient discussed the topic of Anger (Dimension 3). Patient went over information on handout from group folder. Patient identified ways he experiences and shows anger. Patient identified ways other people show anger. Patient identified how this connects with no longer having gambling as a coping skill.   I: Counselor facilitated 1:1 session.  A: Patient appears to have gained awareness on the topic of Anger.   P: Patient will attend group session tonight and Thursday and will meet again with writer for individual session next Tuesday 01/24/23 at 2pm.     Jeni BRIZUELA, [facility] [place]

## 2023-01-18 NOTE — ADDENDUM NOTE
Encounter addended by: Jeni Schmitz [facility] on: 1/17/2023 8:01 PM   Actions taken: Clinical Note Signed

## 2023-01-18 NOTE — PROGRESS NOTES
"Group Therapy Documentation     Was the patient seen in-person or via Telemedicine (if in-person skip to Group Note): Telemedicine    If Telemedicine: The patient's condition can be safely assessed and treated via synchronous audio and visual telemedicine encounter. ? ?      Reason for Telemedicine Visit: Services only offered telehealth    Originating Site (Patient Location): Patient's home    Distant Site (Provider Location): Provider Remote Setting- Home Office    Consent: ?The patient/guardian has verbally consented to: the potential risks and benefits of telemedicine (video visit) versus in person care; bill my insurance or make self-payment for services provided; and responsibility for payment of non-covered services.       Mode of Communication:??Video Conference via Zoom      As the provider I attest to compliance with applicable laws and regulations related to telemedicine.        Patient attended a video group session on the following days: ?          GROUP NOTE:  DATE OF SERVICE:  January 17, 2023    START TIME:  5:30pm    END TIME:  7:18pm    Group Length:   108 minutes    FACILITATOR(S):    Jeni BRIZUELA CGC    TOPIC: BEH Group Therapy, Problem Gambling Group     Number of patients attending the group: 12    Group Therapy Type:  Problem Gambling Group    Group Attendance:  Client attended group     Summary of Group / Topics Discussed:   Group started with introductions to two new group members. All group members took turn with introduction sharing information about themselves and about their gambling. Group continued with a check in and identifying two feelings from all group members. Discussion was held on the \"Emotional Meaning of Money\"(Dimension 3) . Patients identified positive and negative words they associate with the work money. Group ended with a mindfulness reading on \"Give Yourself a Break\".      Patient's response to the group topic/interactions:    Patient appeared to gain understanding in " the emotions attached to money.       Client specific details:    Patient shared with group the stressors he is having with spouse. Patient also shared with group that he and wife will be starting couples counseling on Feb 10th.       Jeni Schmitz LADC, CGC

## 2023-01-19 ENCOUNTER — HOSPITAL ENCOUNTER (OUTPATIENT)
Dept: BEHAVIORAL HEALTH | Facility: CLINIC | Age: 33
Discharge: HOME | End: 2023-01-19
Attending: FAMILY MEDICINE
Payer: COMMERCIAL

## 2023-01-19 PROCEDURE — 90853 GROUP PSYCHOTHERAPY: CPT | Mod: GT,95

## 2023-01-19 NOTE — PROGRESS NOTES
"Patient:  Anastacio Middleton                         Date of Assessment: 10/26/2022            Problem Gambling Progress Note and Treatment Plan Review     Attendance  Group/Individual: Phase I      Groups Attended: 01/17/23, 01/19/23 and individual session on 01/17/2023    Support group attended this week: no    Reporting sobriety:  yes    Client Treatment Goals:   1. Rebuilding family relationships   2. Learning coping skills so not to relapse   3. Feeling better emotionally from what s happened, feeling more like myself\"         Treatment Plan     Treatment Plan Review competed on: January 19, 2023    Staff Members contributing:  Clotilde Fox, [facility] [place], [facility] & CHATA Curry, [facility]                         Received Supervision:  yes    Client: contributed to goals and plan.  Client received copy of plan/revised plan: Yes  Client agrees with plan/revised plan: Yes    Changes to Treatment Plan: No  New Goals added since last review:  No additional goals added at this time.    Goals worked on since last review:  Dimension 1 No reports of gambling.   Dimension 2 Patient reports ability to navigate the healthcare system as needed.   Dimension 3 Patient attended group with topics on: Emotional Meaning of Money and a video: Happiness is all in your mind.   Dimension 4 Patient continues to work on treatment plan goals.   Dimension 5 Patient attended group with topic: Money and Finances.   Dimension 6 Patient reports couples counseling will start Feb 10th.     Strategies effective: yes    Strategies need these changes:   Build on supports in community and attain an individual mental health therapist          Depression and Anxiety at Intake:   Patient's PHQ-9 was 6 out of 27, indicating mild depression.  Patient's LANA-7 score was 9 out of 21, indicating mild anxiety.     Intake Dimension Ratings:    Dimension 1  0    Dimension 2  1    Dimension 3  1    Dimension 4  0    Dimension 5  2    Dimension 6  1      Guide to Risk " Ratings for Suicidality:   IDEATION: Active thoughts of suicide? INTENT: Intent to follow on suicide? PLAN: Plan to follow through on suicide? Level of Risk:   IF Yes Yes Yes Patient = High Emergent   IF Yes Yes No Patient = High Urgent/Non-Emergent   IF Yes No No Patient = Moderate Non-Urgent   IF No No   No Patient = Low Risk   The patient's ADDITIONAL RISK FACTORS and lack of PROTECTIVE FACTORS may increase their overall suicide risk ratings.     Patient's/client's current risk rating:  Low Risk    Safety Plan on File  No risk identified    Family Involvement:   none schedule this week    Data:   client did participate      Intervention:   Counselor feedback  Education      Assessment:   Stages of Change Model  Action    Appears/Sounds:  Engaged      Plan:  Focus on recovery environment  Monitor emotional/physical health      Jeni BRIZUELA, CGC

## 2023-01-20 NOTE — PROGRESS NOTES
"Group Therapy Documentation     Was the patient seen in-person or via Telemedicine (if in-person skip to Group Note): Telemedicine    If Telemedicine: The patient's condition can be safely assessed and treated via synchronous audio and visual telemedicine encounter. ? ?      Reason for Telemedicine Visit: Services only offered telehealth    Originating Site (Patient Location): Patient's home    Distant Site (Provider Location): Provider Remote Setting- Home Office    Consent: ?The patient/guardian has verbally consented to: the potential risks and benefits of telemedicine (video visit) versus in person care; bill my insurance or make self-payment for services provided; and responsibility for payment of non-covered services.       Mode of Communication:??Video Conference via Zoom      As the provider I attest to compliance with applicable laws and regulations related to telemedicine.      Counselor verified Patient with 2-point verification: Patient is known to provider      Patient attended a video group session on the following days: ?          GROUP NOTE:  DATE OF SERVICE:  January 19, 2023    START TIME:  5:30pm    END TIME:  7:25pm    Group Length:   115 minutes    FACILITATOR(S):    Jeni BRIZUELA [facility] [place]    TOPIC: BEH Group Therapy, Problem Gambling Group     Number of patients attending the group: 8    Group Therapy Type:  Problem Gambling Group    Group Attendance:  Client attended group     Summary of Group / Topics Discussed:   Group started with a quick check in from patients. Also, a recap of Tuesdays group topics. Writer and patients discussed what is Mindfulness and everyone participated in a mindfulness activity. Group continued with discussion on the Emotional Meaning if Money and Money and Finances. Patients took turns discussing their history with money and ways to have a healthy relationship with money. Patients watched a video on \"Happiness is all in your mind\" with discussion following video. Group " ended with a mindfulness task/reading.      Patient's response to the group topic/interactions:    Patient appeared to gain a better understanding of their relationship with money.       Client specific details:     Patient was an active member in group but was having issues with his video. Patient participated in discussions and shared he will be using lessons learned from video.       Jeni BRIZUELA, CGC

## 2023-01-24 ENCOUNTER — HOSPITAL ENCOUNTER (OUTPATIENT)
Dept: BEHAVIORAL HEALTH | Facility: CLINIC | Age: 33
Discharge: HOME | End: 2023-01-24
Attending: FAMILY MEDICINE
Payer: COMMERCIAL

## 2023-01-24 PROCEDURE — 90832 PSYTX W PT 30 MINUTES: CPT | Mod: GT,95

## 2023-01-24 PROCEDURE — 90853 GROUP PSYCHOTHERAPY: CPT | Mod: GT,95

## 2023-01-24 ASSESSMENT — ANXIETY QUESTIONNAIRES
IF YOU CHECKED OFF ANY PROBLEMS ON THIS QUESTIONNAIRE, HOW DIFFICULT HAVE THESE PROBLEMS MADE IT FOR YOU TO DO YOUR WORK, TAKE CARE OF THINGS AT HOME, OR GET ALONG WITH OTHER PEOPLE: NOT DIFFICULT AT ALL
6. BECOMING EASILY ANNOYED OR IRRITABLE: SEVERAL DAYS
7. FEELING AFRAID AS IF SOMETHING AWFUL MIGHT HAPPEN: NOT AT ALL
2. NOT BEING ABLE TO STOP OR CONTROL WORRYING: NOT AT ALL
1. FEELING NERVOUS, ANXIOUS, OR ON EDGE: NOT AT ALL
5. BEING SO RESTLESS THAT IT IS HARD TO SIT STILL: NOT AT ALL
GAD7 TOTAL SCORE: 2
4. TROUBLE RELAXING: SEVERAL DAYS
3. WORRYING TOO MUCH ABOUT DIFFERENT THINGS: NOT AT ALL
GAD7 TOTAL SCORE: 2

## 2023-01-24 NOTE — PROGRESS NOTES
Individual counseling session:    Telemedicine Visit: The patient's condition can be safely assessed and treated via synchronous audio and visual telemedicine encounter.      Reason for Telemedicine Visit: Services only offered telehealth    Originating Site (Patient Location): Patient's home    Distant Site (Provider Location): Provider Remote Setting- Home Office    Consent:  The patient/guardian has verbally consented to: the potential risks and benefits of telemedicine (video visit) versus in person care; bill my insurance or make self-payment for services provided; and responsibility for payment of non-covered services.     Mode of Communication:  Video Conference via [employer]    As the provider I attest to compliance with applicable laws and regulations related to telemedicine.    Counselor verified Patient with 2-point verification: Patient is known to provider.    Video visit start time: 2:00pm  Video visit end time: 2:32pm    Length of session: 32 minutes    D: Writer and patient met for a scheduled individual session. Writer discussed with patient he is now in Phase II of programing. Patient completed PHQ-2, LANA-2 and Promis questions. Patient and writer discussed homework on Grief and Loss. Patient shared he will work on ways to show gratitude to wife for homework next week.   I: Counselor facilitated 1:1 session.  A: Patient appeared to gain better awareness in connecting with feelings of grief and loss.  P: Patient will continue with group sessions on Tuesdays and Thursdays. Patient's next individual session will be with writer on Tuesday 01/31/23 at 2pm.       Jeni BRIZUELA, [facility] [place]

## 2023-01-25 NOTE — PROGRESS NOTES
Group Therapy Documentation     Was the patient seen in-person or via Telemedicine (if in-person skip to Group Note): Telemedicine    If Telemedicine: The patient's condition can be safely assessed and treated via synchronous audio and visual telemedicine encounter. ? ?      Reason for Telemedicine Visit: Services only offered telehealth    Originating Site (Patient Location): Patient's home    Distant Site (Provider Location): Provider Remote Setting- Home Office    Consent: ?The patient/guardian has verbally consented to: the potential risks and benefits of telemedicine (video visit) versus in person care; bill my insurance or make self-payment for services provided; and responsibility for payment of non-covered services.       Mode of Communication:??Video Conference via Zoom      As the provider I attest to compliance with applicable laws and regulations related to telemedicine.      Counselor verified Patient with 2-point verification: Patient is known to provider      Patient attended a video group session on the following days: ?          GROUP NOTE:  DATE OF SERVICE:  January 24, 2023    START TIME:  5:30pm    END TIME:  7:22pm    Group Length:   112 minutes    FACILITATOR(S):    Jeni BRIZUELA [facility] [place]     TOPIC: BEH Group Therapy, Problem Gambling Group     Number of patients attending the group: 12    Group Therapy Type:  Problem Gambling Group    Group Attendance:  Client attended group     Summary of Group / Topics Discussed:   Group started with each member taking turns with a check in and identifying two feelings. Group watched a video from YouTube: German Gutierrez - Behavior Model. Then reviewing Chapter 19 Beliefs from the [employer]-Dice Book. Discussion was held on Beliefs, If then, Behavior and then Results. Also discussion on the Belief Window. Group ended with a mindfulness task/reading on Self Compassion.       Patient's response to the group topic/interactions:    Patient appeared to gain more understanding  with the Behavior Model and how they can use this in daily life.       Client specific details:     Patient shared with group the family stressors he continues to have. Patient shared with group he and wife will begin couples counseling in two weeks. Patient also shared with the group his homework from his individual session earlier today on Grief and Loss.       Jeni Schmitz LADC, CGC

## 2023-01-26 ENCOUNTER — HOSPITAL ENCOUNTER (OUTPATIENT)
Dept: BEHAVIORAL HEALTH | Facility: CLINIC | Age: 33
Discharge: HOME | End: 2023-01-26
Attending: FAMILY MEDICINE
Payer: COMMERCIAL

## 2023-01-26 PROCEDURE — 90853 GROUP PSYCHOTHERAPY: CPT | Mod: GT,95

## 2023-01-26 NOTE — PROGRESS NOTES
"Patient:  Anastacio Middleton                         Date of Assessment: 10/26/2022            Problem Gambling Progress Note and Treatment Plan Review     Attendance  Group/Individual: Phase II      Groups Attended: 01/24/23, 01/26/23 and individual session on 01/24/23    Support group attended this week: no    Reporting sobriety:  yes    Client Treatment Goals:   1. Rebuilding family relationships   2. Learning coping skills so not to relapse   3. Feeling better emotionally from what s happened, feeling more like myself\"         Treatment Plan     Treatment Plan Review competed on: January 26, 2023    Staff Members contributing:  Clotilde Fox, [facility], [facility] & Jeni Schmitz [facility], [facility]                         Received Supervision:  yes    Client: contributed to goals and plan.  Client received copy of plan/revised plan: Yes  Client agrees with plan/revised plan: Yes    Changes to Treatment Plan: No  New Goals added since last review:  No additional goals added at this time.    Goals worked on since last review:  Dimension 1 No reported gambling.   Dimension 2 No reports of change in health.   Dimension 3 Patient attended groups with topics: Behavior Model/Beliefs/Belief Window and Explore the real you/Who are you?  Dimension 4 Patient attends group and individual sessions weekly.   Dimension 5 Patient shared with writer in individual session his completed assignment on grief and loss. Patient also spoke to group on Tuesday about assignment and recommendations for other to complete.   Dimension 6 Patient reports he and wife will be starting couples counseling next week.     Strategies effective: yes    Strategies need these changes:   Build on supports in community and attain an individual mental health therapist       Depression and Anxiety at Intake:   Patient's PHQ-9 was 6 out of 27, indicating mild depression.  Patient's LANA-7 score was 9 out of 21, indicating mild anxiety.        Intake Dimension Ratings:    " Dimension 1  0    Dimension 2  1    Dimension 3  1    Dimension 4  0    Dimension 5  2    Dimension 6  1      Guide to Risk Ratings for Suicidality:   IDEATION: Active thoughts of suicide? INTENT: Intent to follow on suicide? PLAN: Plan to follow through on suicide? Level of Risk:   IF Yes Yes Yes Patient = High Emergent   IF Yes Yes No Patient = High Urgent/Non-Emergent   IF Yes No No Patient = Moderate Non-Urgent   IF No No   No Patient = Low Risk   The patient's ADDITIONAL RISK FACTORS and lack of PROTECTIVE FACTORS may increase their overall suicide risk ratings.     Patient's/client's current risk rating:  Low Risk    Safety Plan on File  No risk identified    Family Involvement:   none schedule this week    Data:   client did actively participate      Intervention:   Counselor feedback  Education  Group feedback      Assessment:   Stages of Change Model  Action    Appears/Sounds:  Engaged      Plan:  Focus on recovery environment  Monitor emotional/physical health      Jeni BRIZUELA, CGC

## 2023-01-27 NOTE — PROGRESS NOTES
Group Therapy Documentation     Was the patient seen in-person or via Telemedicine (if in-person skip to Group Note): Telemedicine    If Telemedicine: The patient's condition can be safely assessed and treated via synchronous audio and visual telemedicine encounter. ? ?      Reason for Telemedicine Visit: Services only offered telehealth    Originating Site (Patient Location): Patient's home    Distant Site (Provider Location): Provider Remote Setting- Home Office    Consent: ?The patient/guardian has verbally consented to: the potential risks and benefits of telemedicine (video visit) versus in person care; bill my insurance or make self-payment for services provided; and responsibility for payment of non-covered services.       Mode of Communication:??Video Conference via Zoom      As the provider I attest to compliance with applicable laws and regulations related to telemedicine.      Counselor verified Patient with 2-point verification: Patient is known to provider      Patient attended a video group session on the following days: ?          GROUP NOTE:  DATE OF SERVICE:  January 26, 2023    START TIME:  5:30pm    END TIME:  7:07pm    Group Length:   97 Minutes    FACILITATOR(S):    Jeni BRIZUELA [facility] [place]     TOPIC: BEH Group Therapy, Problem Gambling Group     Number of patients attending the group: 8    Group Therapy Type:  Problem Gambling Group    Group Attendance:  Client attended group     Summary of Group / Topics Discussed:   Group started with a short check in from some of the group members. The group read and discussed Chapter 21 Explore the Real you and Chapter 22 Who are you? From the No-Dice Book. Topics about self-worth, self-esteem and self-image for Dimension 3. Group ended a little early due to writer s illness.      Patient's response to the group topic/interactions:    Patient appeared to gain a better awareness about self.       Client specific details:     Patient shared he wants to start  attending GA meeting and another group member gave patient information on how to connect with his GA group.     Jeni Schmitz [facility], [facility]

## 2023-01-31 ENCOUNTER — HOSPITAL ENCOUNTER (OUTPATIENT)
Dept: BEHAVIORAL HEALTH | Facility: CLINIC | Age: 33
Discharge: HOME | End: 2023-01-31
Attending: FAMILY MEDICINE
Payer: COMMERCIAL

## 2023-01-31 PROCEDURE — 90853 GROUP PSYCHOTHERAPY: CPT | Mod: GT,95

## 2023-01-31 PROCEDURE — 90832 PSYTX W PT 30 MINUTES: CPT | Mod: GT,95

## 2023-01-31 NOTE — PROGRESS NOTES
Individual counseling session:    Telemedicine Visit: The patient's condition can be safely assessed and treated via synchronous audio and visual telemedicine encounter.      Reason for Telemedicine Visit: Patient has requested telehealth visit    Originating Site (Patient Location): Patient's home    Distant Site (Provider Location): Provider Remote Setting- Home Office    Consent:  The patient/guardian has verbally consented to: the potential risks and benefits of telemedicine (video visit) versus in person care; bill my insurance or make self-payment for services provided; and responsibility for payment of non-covered services.     Mode of Communication:  Video Conference via [email or web address]    As the provider I attest to compliance with applicable laws and regulations related to telemedicine.    Counselor verified Patient with 2-point verification: Patient is known to provider.    Video visit start time: 1:59pm  Video visit end time: 2:32 pm    Length of session: 33 minutes    D: Writer and patient met for a scheduled individual video session via [employer]. Writer and patient discussed homework for patient last week on gratitude. Patient shared having gratitude scheduled on his personal calender and specific ways to show gratitude to wife. Patient shared he and wife will have couples counseling next Friday Feb. 10th. They will also be meeting with their financial counselor next month to work on family budget and go over investments. Patient shared with writer he is still not smoking and will work this week to connect with an individual mental health therapist.   I: Counselor facilitated 1:1 session.  A: Patient appeared to be consistently working on treatment goals.   P: Patient will continue to attend group session on Tuesday and Thursdays as well as next individual session with writer next Tuesday Feb. 7th at 2pm. Patient to start the process of finding individual mental health therapist.     Jeni BRIZUELA, [facility]

## 2023-02-01 NOTE — PROGRESS NOTES
Group Therapy Documentation     Was the patient seen in-person or via Telemedicine (if in-person skip to Group Note): Telemedicine    If Telemedicine: The patient's condition can be safely assessed and treated via synchronous audio and visual telemedicine encounter. ? ?      Reason for Telemedicine Visit: Services only offered telehealth    Originating Site (Patient Location): Patient's home    Distant Site (Provider Location): Provider Remote Setting- Home Office    Consent: ?The patient/guardian has verbally consented to: the potential risks and benefits of telemedicine (video visit) versus in person care; bill my insurance or make self-payment for services provided; and responsibility for payment of non-covered services.       Mode of Communication:??Video Conference via Zoom      As the provider I attest to compliance with applicable laws and regulations related to telemedicine.      Counselor verified Patient with 2-point verification: Patient is known to provider      Patient attended a video group session on the following days: ?          GROUP NOTE:  DATE OF SERVICE:  January 31, 2023    START TIME:  5:30pm    END TIME:  7:20pm    Group Length:   110 minutes    FACILITATOR(S):    Jeni BRIZUELA [facility]    TOPIC: BEH Group Therapy, Problem Gambling Group     Number of patients attending the group: 9    Group Therapy Type:  Problem Gambling Group    Group Attendance:  Client attended group     Summary of Group / Topics Discussed:   Group started with each patient taking turns for check in and identifying two emotions. Writer went over information on the topic for tomorrows family group. This will be a speaker from [employer], patients and family members encouraged to attend. Patients watched a short video on PAWS - Post Acute Withdrawal Syndrome for Dimension 2 followed by discussion and more information that included description on how the brain is affected and symptoms that may occur during recovery. Group  ended with a mindfulness reading.      Patient's response to the group topic/interactions:    Patient appeared to gain awareness of PAWS.       Client specific details:   Patient shared with group members how he has been working on being better with showing gratitude with wife. Patient also shared with group the stressors he has been dealing with from family members being sick.     Jeni Schmitz LADC, CGC

## 2023-02-02 ENCOUNTER — HOSPITAL ENCOUNTER (OUTPATIENT)
Dept: BEHAVIORAL HEALTH | Facility: CLINIC | Age: 33
Discharge: HOME | End: 2023-02-02
Attending: FAMILY MEDICINE
Payer: COMMERCIAL

## 2023-02-02 PROCEDURE — 90853 GROUP PSYCHOTHERAPY: CPT | Mod: GT,95

## 2023-02-02 NOTE — PROGRESS NOTES
"Patient:  Anastacio Middleton                         Date of Assessment: 10/26/2022            Problem Gambling Progress Note and Treatment Plan Review     Attendance  Group/Individual: Phase II       Groups Attended:  01/31/23, 02/02/23 and individual session on 01/31/23    Support group attended this week: no    Reporting sobriety:  yes    Client Treatment Goals:   1. Rebuilding family relationships   2. Learning coping skills so not to relapse   3. Feeling better emotionally from what s happened, feeling more like myself\"         Treatment Plan     Treatment Plan Review competed on: February 2, 2023    Staff Members contributing:  Clotilde Fox, [facility], [facility] & Jeni Schmitz, [facility], [facility]                         Received Supervision:  yes    Client: contributed to goals and plan.  Client received copy of plan/revised plan: Yes  Client agrees with plan/revised plan: Yes    Changes to Treatment Plan: No  New Goals added since last review:  No additional goals added at this time.    Goals worked on since last review:  Dimension 1 Patient reports no gambling.   Dimension 2 Patient attended group with topic on PAWS - Post Acute Withdrawal Syndrome.  Dimension 3 Patient attended group with topic on Anger. How to identify levels and how to deal with these strong emotions in the future. Patient reports couples counseling will start on 02/10/23 and patient is activity looking for a individual mental health counselor.   Dimension 4 Patient continues to attend group and individual sessions each week.   Dimension 5 Patient continued to work on treatment plan goals.   Dimension 6 Patient reports working together with wife and meeting with a  this month to go over current investments and a family budget.     Strategies effective: yes    Strategies need these changes:   Build on supports in community and attain an individual mental health therapist     Depression and Anxiety at Intake:   Patient's PHQ-9 was 6 out of 27, " indicating mild depression.  Patient's LANA-7 score was 9 out of 21, indicating mild anxiety.     Intake Dimension Ratings:    Dimension 1  0    Dimension 2  1    Dimension 3  1    Dimension 4  0    Dimension 5  2    Dimension 6  1      Guide to Risk Ratings for Suicidality:   IDEATION: Active thoughts of suicide? INTENT: Intent to follow on suicide? PLAN: Plan to follow through on suicide? Level of Risk:   IF Yes Yes Yes Patient = High Emergent   IF Yes Yes No Patient = High Urgent/Non-Emergent   IF Yes No No Patient = Moderate Non-Urgent   IF No No   No Patient = Low Risk   The patient's ADDITIONAL RISK FACTORS and lack of PROTECTIVE FACTORS may increase their overall suicide risk ratings.     Patient's/client's current risk rating:  Low Risk    Safety Plan on File  No risk identified    Family Involvement:   none schedule this week    Data:   client did actively participate      Intervention:   Counselor feedback  Education  Group feedback      Assessment:   Stages of Change Model  Action    Appears/Sounds:  Cooperative  Engaged      Plan:  Focus on recovery environment  Monitor emotional/physical health      Jeni BRIZUELA, CGC

## 2023-02-03 NOTE — PROGRESS NOTES
Group Therapy Documentation     Was the patient seen in-person or via Telemedicine (if in-person skip to Group Note): Telemedicine    If Telemedicine: The patient's condition can be safely assessed and treated via synchronous audio and visual telemedicine encounter. ? ?      Reason for Telemedicine Visit: Services only offered telehealth    Originating Site (Patient Location): Patient's home    Distant Site (Provider Location): Provider Remote Setting- Home Office    Consent: ?The patient/guardian has verbally consented to: the potential risks and benefits of telemedicine (video visit) versus in person care; bill my insurance or make self-payment for services provided; and responsibility for payment of non-covered services.       Mode of Communication:??Video Conference via Zoom      As the provider I attest to compliance with applicable laws and regulations related to telemedicine.      Counselor verified Patient with 2-point verification: Patient is known to provider      Patient attended a video group session on the following days: ?          GROUP NOTE:  DATE OF SERVICE:  February 2, 2023    START TIME:  5:30pm    END TIME:  7:23pm    Group Length:   113 minutes    FACILITATOR(S):    Jeni BRIZUELA CGC     TOPIC: BEH Group Therapy, Problem Gambling Group     Number of patients attending the group: 8    Group Therapy Type:  Problem Gambling Group    Group Attendance:  Client attended group     Summary of Group / Topics Discussed:   Group started with a revisit on topics discussed on Tuesday and about speaker from Wednesday group. Patient's each took turns sharing two self-care activities they would like to do this weekend. Group went over the chapter on anger from the No-Dice workbook. This was followed by discussion. Patients watched a short video on how to assemble an anger thermometry to help identify their stages of anger.      Patient's response to the group topic/interactions:    Patient appeared to gain a  "better awareness of their stages of anger.       Client specific details:     Patient shared with group a specific situation with being angry and identifying each step. Patient also shared with group he usually \"stuffs it\" when it comes to anger.       Jeni Schmitz LADC, CGC   "

## 2023-02-07 ENCOUNTER — HOSPITAL ENCOUNTER (OUTPATIENT)
Dept: BEHAVIORAL HEALTH | Facility: CLINIC | Age: 33
Discharge: HOME | End: 2023-02-07
Attending: FAMILY MEDICINE
Payer: COMMERCIAL

## 2023-02-07 PROCEDURE — 90853 GROUP PSYCHOTHERAPY: CPT | Mod: GT,95

## 2023-02-07 PROCEDURE — 90832 PSYTX W PT 30 MINUTES: CPT | Mod: GT,95,59

## 2023-02-07 NOTE — PROGRESS NOTES
Individual counseling session:    Telemedicine Visit: The patient's condition can be safely assessed and treated via synchronous audio and visual telemedicine encounter.      Reason for Telemedicine Visit: Services only offered telehealth    Originating Site (Patient Location): Patient's home    Distant Site (Provider Location): Provider Remote Setting- Home Office    Consent:  The patient/guardian has verbally consented to: the potential risks and benefits of telemedicine (video visit) versus in person care; bill my insurance or make self-payment for services provided; and responsibility for payment of non-covered services.     Mode of Communication:  Video Conference via [employer]    As the provider I attest to compliance with applicable laws and regulations related to telemedicine.    Counselor verified Patient with 2-point verification: Patient is known to provider.    Video visit start time: 1:59pm  Video visit end time: 2:30pm    Length of session: 31 minutes    D: Writer and patient met for a scheduled individual virtual session via [employer]. Patient and writer discussed patient's treatment plan goals that were set at the beginning of treatment. Patient requested email with his treatment plan goals listed. Patient also shared he has reached out to two mental health therapists for individual sessions. Patient shared he and wife begin couples counseling on this Friday. Patient continues to show gratitude to wife daily. Patient reported he and wife has a date night scheduled for this weekend.   I: Counselor facilitated 1:1 session.  A: Patient appears to continue to work on treatment plan goals.   P: Writer to email patient treatment plan goals.       Jeni BRIZUELA, [facility]

## 2023-02-08 NOTE — PROGRESS NOTES
Group Therapy Documentation     Was the patient seen in-person or via Telemedicine (if in-person skip to Group Note): Telemedicine    If Telemedicine: The patient's condition can be safely assessed and treated via synchronous audio and visual telemedicine encounter. ? ?      Reason for Telemedicine Visit: Services only offered telehealth    Originating Site (Patient Location): Patient's home    Distant Site (Provider Location): Provider Remote Setting- Home Office    Consent: ?The patient/guardian has verbally consented to: the potential risks and benefits of telemedicine (video visit) versus in person care; bill my insurance or make self-payment for services provided; and responsibility for payment of non-covered services.       Mode of Communication:??Video Conference via Zoom      As the provider I attest to compliance with applicable laws and regulations related to telemedicine.      Counselor verified Patient with 2-point verification: Patient is known to provider      Patient attended a video group session on the following days: ?          GROUP NOTE:  DATE OF SERVICE:  February 7, 2023    START TIME:  5:30pm    END TIME:  7:16pm    Group Length:   106 minutes    FACILITATOR(S):    Jeni BRIZUELA [facility]     TOPIC: BEH Group Therapy, Problem Gambling Group     Number of patients attending the group: 10    Group Therapy Type:  Problem Gambling Group    Group Attendance:  Client attended group     Summary of Group / Topics Discussed:   Group started with each member participating in check in and identifying two feelings/emotions. Group members shared how they work out and how to have going to the gym and working out become part of your schedule. Group discussion was held on Grief and Loss. Working through chapter 25 in the No-Dice Book and watching a video from [employer]: 7 signs You're not Dealing with your Grief and Loss. Discussion topics were on identifying grief and loss and learning how to deal with these strong  emotions in a healthy way. Group ended with a positive reading.      Patient's response to the group topic/interactions:   Patient appeared to gain a better awareness on Grief and Loss and ways to deal with these emotions in a healthy way.       Client specific details:    Patient shared with group family stressors of children being sick over the weekend. Patient reported he and wife will be meeting with their  on Thursday and couples counseling on Friday.       Jeni BRIZUELA, CGC

## 2023-02-09 ENCOUNTER — TELEPHONE (OUTPATIENT)
Dept: BEHAVIORAL HEALTH | Facility: CLINIC | Age: 33
End: 2023-02-09
Payer: COMMERCIAL

## 2023-02-14 ENCOUNTER — HOSPITAL ENCOUNTER (OUTPATIENT)
Dept: BEHAVIORAL HEALTH | Facility: CLINIC | Age: 33
Discharge: HOME | End: 2023-02-14
Attending: FAMILY MEDICINE
Payer: COMMERCIAL

## 2023-02-14 PROCEDURE — 90853 GROUP PSYCHOTHERAPY: CPT | Mod: GT,95

## 2023-02-14 PROCEDURE — 90832 PSYTX W PT 30 MINUTES: CPT | Mod: GT,95,59

## 2023-02-14 NOTE — PROGRESS NOTES
Individual counseling session:    Telemedicine Visit: The patient's condition can be safely assessed and treated via synchronous audio and visual telemedicine encounter.      Reason for Telemedicine Visit: Patient has requested telehealth visit    Originating Site (Patient Location): Patient's home    Distant Site (Provider Location): Provider Remote Setting- Home Office    Consent:  The patient/guardian has verbally consented to: the potential risks and benefits of telemedicine (video visit) versus in person care; bill my insurance or make self-payment for services provided; and responsibility for payment of non-covered services.     Mode of Communication:  Video Conference via [employer]    As the provider I attest to compliance with applicable laws and regulations related to telemedicine.    Counselor verified Patient with 2-point verification: Patient is known to provider.    Video visit start time: 10:59am  Video visit end time: 11:30am    Length of session: 31 minutes    D: Patient and writer met for a scheduled video individual session via [employer]. Patient shared he has started couples counseling with Lucy Grimm at [facility] in [place]. These appointments will be on Fridays. Patient also has started with an individual mental health therapist Annalise Molina at [facility] in [place], he will be meeting with this therapist every other Tuesday. Patient reported each introductory appointment went well. Patient will no longer meet with writer for individual sessions every Tuesdays. Patient reported doing much better from illness last week. Patient reported he and wife met with  last week. Patient and wife this past weekend started on new family budget and will meet again with  in March.   I: Counselor facilitated 1:1 session.  A: Patient appears to continue to work on treatment plan goals with attaining new therapists and working on family budget.   P: Patient  will continue attending group sessions on Tuesday and Thursdays. Patient will meet with writer for individual sessions as needed.       Jeni BRIZUELA, CGC

## 2023-02-15 NOTE — PROGRESS NOTES
Group Therapy Documentation     Was the patient seen in-person or via Telemedicine (if in-person skip to Group Note): Telemedicine    If Telemedicine: The patient's condition can be safely assessed and treated via synchronous audio and visual telemedicine encounter. ? ?      Reason for Telemedicine Visit: Services only offered telehealth    Originating Site (Patient Location): Patient's home    Distant Site (Provider Location): Provider Remote Setting- Home Office    Consent: ?The patient/guardian has verbally consented to: the potential risks and benefits of telemedicine (video visit) versus in person care; bill my insurance or make self-payment for services provided; and responsibility for payment of non-covered services.       Mode of Communication:??Video Conference via Zoom      As the provider I attest to compliance with applicable laws and regulations related to telemedicine.      Counselor verified Patient with 2-point verification: Patient is known to provider      Patient attended a video group session on the following days: ?          GROUP NOTE:  DATE OF SERVICE:  February 14, 2023    START TIME:  5:30pm    END TIME:  7:19pm    Group Length:   109 minutes    FACILITATOR(S):    [place]eni [place]RI[place]EL[place] [facility] [place] [facility] [place]IC: BEH Group Therapy, Problem Gambling Group     Number of patients attending the group: 8    Group Therapy Type:  Problem Gambling Group    Group Attendance:  Client attended group     Summary of Group / Topics Discussed:   Group started with each patient taking turns with check in and identifying two feelings. Discussion was held on urges and triggers associate with the super bowl. The group watched the video: How I Tricked my Brain to Like Doing Hard Things (Dopamine Detox). Discussion after video included topics on setting limits with ourselves, the same as teaching children. How to limit high dopamine activities and add lower dopamine active to daily life. Patients discussed questions from the  "worksheet \"Get Motivated\", all for dimension 4. Patients watched short video on SMART phrases. Patients were assigned to each come up with one SMART phrase for their next group session. Group ended with a Positive Thinking reading.      Patient's response to the group topic/interactions:   Patient appeared to gain awareness how dopamine affects the brain and on motivation.       Client specific details:  Patient shared with group, now seeing couples counselor and individual mental health therapist. Patient also shared he and wife are working on a household budget. Patient shared he and wife spent time with friends this past weekend and he did not watch the super bowl. Patient discussed scheduling specific dopamine detox days. Patient mentioned that his definition of motivation was drive.     Jeni Schmitz LADC, [facility] [place]   "

## 2023-02-16 ENCOUNTER — HOSPITAL ENCOUNTER (OUTPATIENT)
Dept: BEHAVIORAL HEALTH | Facility: CLINIC | Age: 33
Discharge: HOME | End: 2023-02-16
Attending: FAMILY MEDICINE
Payer: COMMERCIAL

## 2023-02-16 PROCEDURE — 90853 GROUP PSYCHOTHERAPY: CPT | Mod: GT,95

## 2023-02-16 NOTE — PROGRESS NOTES
"Patient:  Anastacio Middleton                         Date of Assessment: 10/26/2022            Problem Gambling Progress Note and Treatment Plan Review     Attendance  Group/Individual: Phase II      Groups Attended: 02/14/23 and individual session on 02/14/23, 02/16/23    Support group attended this week: no    Reporting sobriety:  yes    Client Treatment Goals:   1. Rebuilding family relationships   2. Learning coping skills so not to relapse   3. Feeling better emotionally from what s happened, feeling more like myself\"            Treatment Plan     Treatment Plan Review competed on: February 16, 2023    Staff Members contributing:  Clotilde Fox, [facility], [facility] [place] & Jeni Schmitz, [facility], [facility] [place]                         Received Supervision:  yes    Client: contributed to goals and plan.  Client received copy of plan/revised plan: Yes  Client agrees with plan/revised plan: Yes    Changes to Treatment Plan: No  New Goals added since last review:  No additional goals added at this time.    Goals worked on since last review:  Dimension 1 Patient reports no gambling.   Dimension 2 Patient reports 24 hour flu last week, now recovered.   Dimension 3 Patient reports first sessions completed last week with couples counselor and individual mental health therapist. Now that patient is established with mental health provider he will no longer have weekly individual session with counselor.   Dimension 4 Patient attended group with video and discussion on How I Tricked my Brain to Like Doing Hard Things (Dopamine Detox). Also went over questions and discussion on Worksheet \"Get Motivated\". Patient attended group with topic S.M.A.R.T Goals watching a video, presenting personal goal and discussion.   Dimension 5 Patient reported he and wife meet with  last week and spent time over the weekend working on household budget.   Dimension 6 Patient shared he and wife had a date night going out of dinner and rock climbing. "     Strategies effective: yes    Strategies need these changes:   Continue to build supports in the community, find a home GA meeting to attend.     Depression and Anxiety at Intake:   Patient's PHQ-9 was 6 out of 27, indicating mild depression.  Patient's LANA-7 score was 9 out of 21, indicating mild anxiety.          Intake Dimension Ratings:    Dimension 1  0    Dimension 2  1    Dimension 3  1    Dimension 4  0    Dimension 5  2    Dimension 6  1      Guide to Risk Ratings for Suicidality:   IDEATION: Active thoughts of suicide? INTENT: Intent to follow on suicide? PLAN: Plan to follow through on suicide? Level of Risk:   IF Yes Yes Yes Patient = High Emergent   IF Yes Yes No Patient = High Urgent/Non-Emergent   IF Yes No No Patient = Moderate Non-Urgent   IF No No   No Patient = Low Risk   The patient's ADDITIONAL RISK FACTORS and lack of PROTECTIVE FACTORS may increase their overall suicide risk ratings.     Patient's/client's current risk rating:  Low Risk    Safety Plan on File  No risk identified    Family Involvement:   none schedule this week    Data:   client did actively participate      Intervention:   Education      Assessment:   Stages of Change Model  Action    Appears/Sounds:  Engaged      Plan:  Focus on recovery environment  Monitor emotional/physical health      Jeni BRIZUELA, CGC

## 2023-02-17 NOTE — PROGRESS NOTES
Group Therapy Documentation     Was the patient seen in-person or via Telemedicine (if in-person skip to Group Note): Telemedicine    If Telemedicine: The patient's condition can be safely assessed and treated via synchronous audio and visual telemedicine encounter. ? ?      Reason for Telemedicine Visit: Services only offered telehealth    Originating Site (Patient Location): Patient's home    Distant Site (Provider Location): Provider Remote Setting- Home Office    Consent: ?The patient/guardian has verbally consented to: the potential risks and benefits of telemedicine (video visit) versus in person care; bill my insurance or make self-payment for services provided; and responsibility for payment of non-covered services.       Mode of Communication:??Video Conference via Zoom      As the provider I attest to compliance with applicable laws and regulations related to telemedicine.      Counselor verified Patient with 2-point verification: Patient is known to provider      Patient attended a video group session on the following days: ?          GROUP NOTE:  DATE OF SERVICE:  February 16, 2023    START TIME:  5:30pm    END TIME:  7:26pm    Group Length:   116 Minutes    FACILITATOR(S):    Jeni BRIZUELA [facility] [place]     TOPIC: BEH Group Therapy, Problem Gambling Group     Number of patients attending the group: 6    Group Therapy Type:  Problem Gambling Group    Group Attendance:  Client attended group     Summary of Group / Topics Discussed: Group started with each member checking in and identifying two feelings. Writer shared two articles to group: Snowball vs. Avalanche Debt Paydown and Taxes and Betting. Discussion was held on ways to pay down debt and how to complete taxes for a gambler. Group watched a video on what are SMART Goals and how to create them, for dimension 4. Group completed A-Z Sober activity and group ended with a positive thought reading.      Goal Setting   This topic will give a general overview of  "effective goal setting strategies using S.M.A.R.T. goals (Specific, measurable, attainable, relative, and timely).    ?   Objective(s):    1. Patients will identify the characteristics of S.M.A.R.T. Goals   2.   Patients will identify one thing they are doing well, and one thing they need to work on, regarding life categories discussed.   3.   Patients will be able to list one S.M.A.R.T. goal.   ?   ?   Structure (modalities, homework, worksheets, etc):    1. Provide psychoeducation on goal setting using S.M.A.R.T phrase   2. Work together to complete worksheet on setting goals.   ?   Expected therapeutic outcome(s):    Patient will:   1. Identify areas that need improvement and establish a S.M.A.R.T goal for each improvement needed.    2. Learn how to set more effective goals.    ?   Therapeutic outcome(s) measured by:    Completion of goal setting worksheet and ability to identify goals that are specific, measurable, attainable relevant, and timely.?   ?      Attestation: ??John Sepulveda MD -?Medical Director -?Provides oversight and supervision of care.      Client specific details:  Patient shared with group that work is \"going well\". Patient shared excitement for scheduled game night this weekend with friends. Patient created SMART Goal of creating an exercise routine of completing three cardio workouts per week to be implemented this coming week.      Plan: Counselor will follow up with client to see if he has followed thru with his identified goal.        Jeni BRIZUELA, [facility] [place]   "

## 2023-02-21 ENCOUNTER — HOSPITAL ENCOUNTER (OUTPATIENT)
Dept: BEHAVIORAL HEALTH | Facility: CLINIC | Age: 33
Discharge: HOME | End: 2023-02-21
Attending: FAMILY MEDICINE
Payer: COMMERCIAL

## 2023-02-21 PROCEDURE — 90853 GROUP PSYCHOTHERAPY: CPT | Mod: GT,95

## 2023-02-22 NOTE — PROGRESS NOTES
Group Therapy Documentation     Was the patient seen in-person or via Telemedicine (if in-person skip to Group Note): Telemedicine    If Telemedicine: The patient's condition can be safely assessed and treated via synchronous audio and visual telemedicine encounter. ? ?      Reason for Telemedicine Visit: Services only offered telehealth    Originating Site (Patient Location): Patient's home    Distant Site (Provider Location): Provider Remote Setting- Home Office    Consent: ?The patient/guardian has verbally consented to: the potential risks and benefits of telemedicine (video visit) versus in person care; bill my insurance or make self-payment for services provided; and responsibility for payment of non-covered services.       Mode of Communication:??Video Conference via Zoom      As the provider I attest to compliance with applicable laws and regulations related to telemedicine.      Counselor verified Patient with 2-point verification: Patient is known to provider      Patient attended a video group session on the following days: ?          GROUP NOTE:  DATE OF SERVICE:  February 21, 2023    START TIME:  5:30pm    END TIME:  7:19pm    Group Length:   109 minutes    FACILITATOR(S):    Jeni BRIZUELA [facility] [place]     TOPIC: BEH Group Therapy, Problem Gambling Group     Number of patients attending the group: 9    Group Therapy Type:  Problem Gambling Group    Group Attendance:  Client attended group     Summary of Group / Topics Discussed:   Group started with each patient taking turns for check in and identifying two feelings. Group revisited topics from last Thursdays group on S.M.A.R.T goals. Patients that had no attended group on Thursday shared their goals. Writer shared more information on one sober activity from last Thursday - Geocaching. Group watched three short videos on what is Geocaching and how to do it. Writer shared different tricks to find caches and how this can be a group activity with family, friends  and Geocaching groups in the area for dimension 6. Some patients shared stories they have heard about this activity. Group members who have attended GA shared what character defects are and how step 6 is worked. Group members watched a short video on: The Science of Character. From the [place] and writer went over information on the [employer] website. Group discussion was held on character strengths and how to find them. Information was also shared with group on the book Strength Finders 2.0 and worksheet on Strengths and Qualities for Dimension 4. Email to patients at end of group with worksheet and we will discuss more at Thursday s group. Group ended with a Positive Thoughts reading.      Patient's response to the group topic/interactions:    Patient appeared to gain more understanding of character strengths.       Client specific details:    Patient shared he spent time with family this past weekend. Also attending a winter carnival with his children and riding a fat tire bike. Patient mentioned he had wanted to try this bike and got his workout in by trying it. Patient shared he was suppose to have a therapy session with his mental health therapist today but this was rescheduled to March 14th. Patient shared disappointment at the last minute cancellation. Patient shared information he know about Geocaching and having friends that participate in this.       Jeni BRIZUELA, CGC

## 2023-02-23 ENCOUNTER — HOSPITAL ENCOUNTER (OUTPATIENT)
Dept: BEHAVIORAL HEALTH | Facility: CLINIC | Age: 33
Discharge: HOME | End: 2023-02-23
Attending: FAMILY MEDICINE
Payer: COMMERCIAL

## 2023-02-23 PROCEDURE — 90853 GROUP PSYCHOTHERAPY: CPT | Mod: GT,95

## 2023-02-23 NOTE — PROGRESS NOTES
"Patient:  Anastacio Middleton                         Date of Assessment: 10/26/2022            Problem Gambling Progress Note and Treatment Plan Review     Attendance  Group/Individual: Phase II      Groups Attended: 02/21/2023,02/23/2023    Support group attended this week: no    Reporting sobriety:  yes    Client Treatment Goals:   1. Rebuilding family relationships   2. Learning coping skills so not to relapse   3. Feeling better emotionally from what s happened, feeling more like myself\"         Treatment Plan     Treatment Plan Review competed on: February 23, 2023    Staff Members contributing:  Clotilde Fox, [facility], [facility] [place] & Jeni Schmitz, [facility], [facility]                         Received Supervision:  yes    Client: contributed to goals and plan.  Client received copy of plan/revised plan: Yes  Client agrees with plan/revised plan: Yes    Changes to Treatment Plan: No  New Goals added since last review:  No additional goals added at this time.    Goals worked on since last review:  Dimension 1 Patient reports no gambling.   Dimension 2 No reported change in health.   Dimension 3 Patient reported appointment with mental health therapist was cancelled and reschedule in two weeks.   Dimension 4 Patient attended group with topic on Character Strengths. Watching a short video and adding to discussion. Patient attended group with worksheet Strength and Qualities, followed by discussion.   Dimension 5 Patient continues to attend group sessions and adds to discussion.   Dimension 6 Patient attended group with discussion and short videos on Geocaching. Patient reported spending time with family this past weekend and attending [employer]. Patient attended group with video and discussion on:  The Addiction Machine  .     Strategies effective: yes    Strategies need these changes:   Continue to build supports in the community, find a home GA meeting to attend.        Depression and Anxiety at Intake:   Patient's PHQ-9 was 6 out of 27, " indicating mild depression.  Patient's LANA-7 score was 9 out of 21, indicating mild anxiety.        Intake Dimension Ratings:    Dimension 1  0    Dimension 2  1    Dimension 3  1    Dimension 4  0    Dimension 5  2    Dimension 6  1      Guide to Risk Ratings for Suicidality:   IDEATION: Active thoughts of suicide? INTENT: Intent to follow on suicide? PLAN: Plan to follow through on suicide? Level of Risk:   IF Yes Yes Yes Patient = High Emergent   IF Yes Yes No Patient = High Urgent/Non-Emergent   IF Yes No No Patient = Moderate Non-Urgent   IF No No   No Patient = Low Risk   The patient's ADDITIONAL RISK FACTORS and lack of PROTECTIVE FACTORS may increase their overall suicide risk ratings.     Patient's/client's current risk rating:  Low Risk    Safety Plan on File  No risk identified    Family Involvement:   none schedule this week    Data:   client did participate      Intervention:   Counselor feedback  Education  Group feedback      Assessment:   Stages of Change Model  Action    Appears/Sounds:  Engaged      Plan:  Focus on recovery environment  Monitor emotional/physical health      Jeni BRIZUELA, CGC

## 2023-02-24 NOTE — PROGRESS NOTES
"Group Therapy Documentation     Was the patient seen in-person or via Telemedicine (if in-person skip to Group Note): Telemedicine    If Telemedicine: The patient's condition can be safely assessed and treated via synchronous audio and visual telemedicine encounter. ? ?      Reason for Telemedicine Visit: Services only offered telehealth    Originating Site (Patient Location): Patient's home    Distant Site (Provider Location): Provider Remote Setting- Home Office    Consent: ?The patient/guardian has verbally consented to: the potential risks and benefits of telemedicine (video visit) versus in person care; bill my insurance or make self-payment for services provided; and responsibility for payment of non-covered services.       Mode of Communication:??Video Conference via Zoom      As the provider I attest to compliance with applicable laws and regulations related to telemedicine.      Counselor verified Patient with 2-point verification: Patient is known to provider      Patient attended a video group session on the following days: ?          GROUP NOTE:  DATE OF SERVICE:  February 23, 2023    START TIME:  5:30pm    END TIME:  7:22pm    Group Length:   112 minutes    FACILITATOR(S):    Jeni BRIZUELA [facility] [place] [facility] [place]    TOPIC: BEH Group Therapy, Problem Gambling Group     Number of patients attending the group: 6    Group Therapy Type:  Problem Gambling Group    Group Attendance:  Client attended group     Summary of Group / Topics Discussed:   Group started with each patient taking turns for check in and identifying two feelings. Follow up discussion was held on topics from Tuesday group: Character Strengths and other team building personality tests. Everyone in group completed the worksheet My Strengths and Qualities. Each group member shared their answers and the group discussed why this assignment was difficult. The group started watching the video \"The Addiction Machine\" and will continue with it next Tuesday in group. " "Some of the discussion following the first part of the video were on: how many people are involved in the creation of a slot machine. The music involved, the math involved and the evolution of slot machines. Group ended with a reading from Positive Thoughts.      Patient's response to the group topic/interactions:    Patient appeared able to assess their own personal strengths. Also gaining knowledge in what it takes to create a slot machine.        Client specific details:    Patient shared he spent time with close friend Tuesday after group. Patient shared with friend he is in recovery. Patient mentioned this is the first friend he has shared this information with. Patient shared he identifies as being a \"good dad\" as one of his strengths.       Jeni BRIZUELA, [facility] [place]   "

## 2023-03-02 ENCOUNTER — HOSPITAL ENCOUNTER (OUTPATIENT)
Dept: BEHAVIORAL HEALTH | Facility: CLINIC | Age: 33
Discharge: HOME | End: 2023-03-02
Attending: FAMILY MEDICINE
Payer: COMMERCIAL

## 2023-03-02 PROCEDURE — 90853 GROUP PSYCHOTHERAPY: CPT | Mod: GT,95

## 2023-03-03 NOTE — PROGRESS NOTES
"Patient:  Anastacio Middleton                         Date of Assessment: 10/26/2022            Problem Gambling Progress Note and Treatment Plan Review     Attendance  Group/Individual: Phase II      Groups Attended: 03/02/2023    Support group attended this week: no    Reporting sobriety:  yes    Client Treatment Goals:   1. Rebuilding family relationships   2. Learning coping skills so not to relapse   3. Feeling better emotionally from what s happened, feeling more like myself\"         Treatment Plan     Treatment Plan Review competed on: March 2, 2023    Staff Members contributing:  Clotilde Fox, [facility], [facility] [place] & Jeni Schmitz, [facility], [facility] [place]                         Received Supervision:  yes    Client: contributed to goals and plan.  Client received copy of plan/revised plan: Yes  Client agrees with plan/revised plan: Yes    Changes to Treatment Plan: No  New Goals added since last review:  No additional goals added at this time.      Goals worked on since last review:  Dimension 1 Patient reported no gambling.   Dimension 2 No reports of change in health.   Dimension 3 Patient shared he continues with couple counseling and their third session will be tomorrow.   Dimension 4 Patient completed worksheet on Busyness and added to discussion.   Dimension 5 Patient continues to work on treatment plan goals.   Dimension 6 Patient shared he has friends coming in from out of town this weekend. Planning on spending time with them.     Strategies effective: yes    Strategies need these changes:   Continue to build supports in the community, find a home GA meeting to attend.        Depression and Anxiety at Intake:   Patient's PHQ-9 was 6 out of 27, indicating mild depression.  Patient's LANA-7 score was 9 out of 21, indicating mild anxiety.        Intake Dimension Ratings:    Dimension 1  0    Dimension 2  1    Dimension 3  1    Dimension 4  0    Dimension 5  2    Dimension 6  1      Guide to Risk Ratings for Suicidality: "   IDEATION: Active thoughts of suicide? INTENT: Intent to follow on suicide? PLAN: Plan to follow through on suicide? Level of Risk:   IF Yes Yes Yes Patient = High Emergent   IF Yes Yes No Patient = High Urgent/Non-Emergent   IF Yes No No Patient = Moderate Non-Urgent   IF No No   No Patient = Low Risk   The patient's ADDITIONAL RISK FACTORS and lack of PROTECTIVE FACTORS may increase their overall suicide risk ratings.     Patient's/client's current risk rating:  Low Risk    Safety Plan on File  No risk identified    Family Involvement:   none schedule this week    Data:   client did participate      Intervention:   Counselor feedback  Education  Group feedback      Assessment:   Stages of Change Model  Action    Appears/Sounds:  Engaged      Plan:  Focus on recovery environment  Monitor emotional/physical health      Jeni BRIZUELA, CGC
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
## 2023-03-07 ENCOUNTER — HOSPITAL ENCOUNTER (OUTPATIENT)
Dept: BEHAVIORAL HEALTH | Facility: CLINIC | Age: 33
Discharge: HOME | End: 2023-03-07
Attending: FAMILY MEDICINE
Payer: COMMERCIAL

## 2023-03-07 PROCEDURE — 90853 GROUP PSYCHOTHERAPY: CPT | Mod: GT,95

## 2023-03-08 ENCOUNTER — TELEPHONE (OUTPATIENT)
Dept: BEHAVIORAL HEALTH | Facility: CLINIC | Age: 33
End: 2023-03-08
Payer: COMMERCIAL

## 2023-03-08 NOTE — TELEPHONE ENCOUNTER
----- Message from CHATA Espinoza sent at 3/8/2023  8:00 AM CST -----  Regarding: adding more appointments  Please add 10 more Phase I Gambling OP Video Group sessions    Location of programming:  Video Group Sessions  Start Date:   03/14/2023  Group: Problem Gambling Outpatient Group Only Tuesdays at 5:30-7:30pm  Providers: Clotilde Navarro & Jeni Schmitz  Number of visits to be scheduled: 10  Length/Duration of Appointment in minutes: 120  Visit Type (VIDEO/TELEPHONE/IN-PERSON):   Group Therapy Video Visit    [facility] gambling effie has been applied for.    Thank you!    Jeni Schmitz

## 2023-03-08 NOTE — PROGRESS NOTES
Group Therapy Documentation     Was the patient seen in-person or via Telemedicine (if in-person skip to Group Note): Telemedicine    If Telemedicine: The patient's condition can be safely assessed and treated via synchronous audio and visual telemedicine encounter. ? ?      Reason for Telemedicine Visit: Services only offered telehealth    Originating Site (Patient Location): Patient's home    Distant Site (Provider Location): Provider Remote Setting- Home Office    Consent: ?The patient/guardian has verbally consented to: the potential risks and benefits of telemedicine (video visit) versus in person care; bill my insurance or make self-payment for services provided; and responsibility for payment of non-covered services.       Mode of Communication:??Video Conference via Zoom      As the provider I attest to compliance with applicable laws and regulations related to telemedicine.      Counselor verified Patient with 2-point verification: Patient is known to provider      Patient attended a video group session on the following days: ?          GROUP NOTE:  DATE OF SERVICE:  March 7, 2023    START TIME:  5:30pm    END TIME:  7:23pm    Group Length:   113 minutes    FACILITATOR(S):   Jeni BRIZUELA [facility]     TOPIC: BEH Group Therapy, Problem Gambling Group     Number of patients attending the group: 9    Group Therapy Type:  Problem Gambling Group    Group Attendance:  Client attended group     Summary of Group / Topics Discussed:   Group started with an introduction of our new group member. Each patient took turns with introducing themselves and sharing some information about their gambling and their recovery. Each patient then took turns for check in and identifying two feelings. Writer reviewed information shared at last Thursday s group to patients who were not in attendance. Going over worksheet with discussion on Busyness. Then we held a graduation ceremony for one patient that has completed our program. Writer  and each patient in group took turns to wish the graduating patient well and shared positive stories of how the graduating patient has impacted the group and group members. Graduating patient took time to thank writer and group members. After graduating patient signed off from group, discussion was held on the importance of checking your credit reports. Writer shared with group ways to attain credit reports and reasons to check this once per year. Discussion was held on apps that will also keep track of credit report for you for dimension 6.      Patient's response to the group topic/interactions:    Patient shared appreciation to graduating patient. Also was welcoming to new group member.       Client specific details:    Patient shared he and wife had another session with couples counselor. Patient shared he and wife sent time with friends over the weekend. Patient mentioned that he and wife will be sharing with wife's mother this weekend about patient's recovery. Writer asked patient via email yesterday if he would stay after group time to meet. Patient and writer met after group and discussed patient moving up to Phase III. Patient agreed and would like to start this week. Patient will be attending only Tuesday groups going forward. Patient and writer will meet again on March 28th to discuss graduation plans.       Jeni BRIZUELA, [facility] [place] [facility]

## 2023-03-09 NOTE — PROGRESS NOTES
"Patient:  Anastacio Middleton                         Date of Assessment: 10/26/2022            Problem Gambling Progress Note and Treatment Plan Review     Attendance  Group/Individual: Phase III      Groups Attended: 03/07/2023    Support group attended this week: no    Reporting sobriety:  yes    Client Treatment Goals:   1. Rebuilding family relationships   2. Learning coping skills so not to relapse   3. Feeling better emotionally from what s happened, feeling more like myself\"         Treatment Plan     Treatment Plan Review competed on: March 9, 2023    Staff Members contributing:  Clotilde Fox, [facility], [facility] & CHATA Curry, [facility]                         Received Supervision:  yes    Client: contributed to goals and plan.  Client received copy of plan/revised plan: Yes  Client agrees with plan/revised plan: Yes    Changes to Treatment Plan: No  New Goals added since last review:  No additional goals added at this time.    Goals worked on since last review:  Dimension 1 Patient reported no gambling.   Dimension 2 No reported changes in health.   Dimension 3 Patient reports continued sessions with couples and individual counselors.   Dimension 4 Patient met with writer after group on Tuesday and after discussion, it was decided to move patient up to Phase III in program. Patient will be attending on Tuesday group session going forward.   Dimension 5 Patient attended group and added to discussion on topic: Checking your Credit Report - Difference between credit reports and credit score.  Dimension 6 Patient reports sharing his recovery with another family member.     Strategies effective: yes    Strategies need these changes:   Continue to build supports in the community, find a home GA meeting to attend.        Depression and Anxiety at Intake:   Patient's PHQ-9 was 6 out of 27, indicating mild depression.  Patient's LANA-7 score was 9 out of 21, indicating mild anxiety.        Intake Dimension Ratings:    " Dimension 1  0    Dimension 2  1    Dimension 3  1    Dimension 4  0    Dimension 5  2    Dimension 6  1      Guide to Risk Ratings for Suicidality:   IDEATION: Active thoughts of suicide? INTENT: Intent to follow on suicide? PLAN: Plan to follow through on suicide? Level of Risk:   IF Yes Yes Yes Patient = High Emergent   IF Yes Yes No Patient = High Urgent/Non-Emergent   IF Yes No No Patient = Moderate Non-Urgent   IF No No   No Patient = Low Risk   The patient's ADDITIONAL RISK FACTORS and lack of PROTECTIVE FACTORS may increase their overall suicide risk ratings.     Patient's/client's current risk rating:  Low Risk    Safety Plan on File  No risk identified    Family Involvement:   none schedule this week    Data:   client did actively participate      Intervention:   Education  Group feedback      Assessment:   Stages of Change Model  Action    Appears/Sounds:  Cooperative  Engaged      Plan:  Focus on recovery environment      Jeni BRIZUELA, CGC

## 2023-03-09 NOTE — ADDENDUM NOTE
Encounter addended by: Jeni Schmitz [facility] on: 3/9/2023 11:45 AM   Actions taken: Clinical Note Signed

## 2023-03-14 ENCOUNTER — HOSPITAL ENCOUNTER (OUTPATIENT)
Dept: BEHAVIORAL HEALTH | Facility: CLINIC | Age: 33
Discharge: HOME | End: 2023-03-14
Attending: FAMILY MEDICINE
Payer: COMMERCIAL

## 2023-03-14 PROCEDURE — 90853 GROUP PSYCHOTHERAPY: CPT | Mod: GT,95

## 2023-03-14 ASSESSMENT — ANXIETY QUESTIONNAIRES
IF YOU CHECKED OFF ANY PROBLEMS ON THIS QUESTIONNAIRE, HOW DIFFICULT HAVE THESE PROBLEMS MADE IT FOR YOU TO DO YOUR WORK, TAKE CARE OF THINGS AT HOME, OR GET ALONG WITH OTHER PEOPLE: NOT DIFFICULT AT ALL
6. BECOMING EASILY ANNOYED OR IRRITABLE: SEVERAL DAYS
1. FEELING NERVOUS, ANXIOUS, OR ON EDGE: SEVERAL DAYS
5. BEING SO RESTLESS THAT IT IS HARD TO SIT STILL: NOT AT ALL
2. NOT BEING ABLE TO STOP OR CONTROL WORRYING: NOT AT ALL
GAD7 TOTAL SCORE: 3
4. TROUBLE RELAXING: SEVERAL DAYS
3. WORRYING TOO MUCH ABOUT DIFFERENT THINGS: NOT AT ALL
GAD7 TOTAL SCORE: 3
7. FEELING AFRAID AS IF SOMETHING AWFUL MIGHT HAPPEN: NOT AT ALL

## 2023-03-15 NOTE — PROGRESS NOTES
"Patient:  Anastacio Middleton                         Date of Assessment: 10/26/2022            Problem Gambling Progress Note and Treatment Plan Review     Attendance  Group/Individual: Phase III      Groups Attended: 03/14/2023    Support group attended this week: no    Reporting sobriety:  yes    Client Treatment Goals:   1. Rebuilding family relationships   2. Learning coping skills so not to relapse   3. Feeling better emotionally from what s happened, feeling more like myself\"         Treatment Plan     Treatment Plan Review competed on: March 15, 2023    Staff Members contributing:  Clotilde Fox, [facility], [facility] & Jeni Schmitz, CHATA, [facility]                         Received Supervision:  yes    Client: contributed to goals and plan.  Client received copy of plan/revised plan: Yes  Client agrees with plan/revised plan: Yes    Changes to Treatment Plan: No  New Goals added since last review:  No additional goals added at this time.    Goals worked on since last review:  Dimension 1 Patient reported no gambling.   Dimension 2 No reported changes in health.   Dimension 3 Patient reports continued sessions with marriage counselor.   Dimension 4 Patient continues to attend group session on Tuesdays. Patient is down to one session per week now in Phase III.   Dimension 5 Session attended Tuesday was information and discussion on Gamblers Anonymous and SMART Recovery.   Dimension 6 Patient shared completing audio book on The 5 Love Languages.     Strategies effective: yes    Strategies need these changes:   Continue to build supports in the community, find a home GA meeting or SMART Recovery meeting to attend.        Depression and Anxiety at Intake:   Patient's PHQ-9 was 6 out of 27, indicating mild depression.  Patient's LANA-7 score was 9 out of 21, indicating mild anxiety.        Intake Dimension Ratings:    Dimension 1  0    Dimension 2  1    Dimension 3  1    Dimension 4  0    Dimension 5  2    Dimension 6  1      Guide to " Risk Ratings for Suicidality:   IDEATION: Active thoughts of suicide? INTENT: Intent to follow on suicide? PLAN: Plan to follow through on suicide? Level of Risk:   IF Yes Yes Yes Patient = High Emergent   IF Yes Yes No Patient = High Urgent/Non-Emergent   IF Yes No No Patient = Moderate Non-Urgent   IF No No   No Patient = Low Risk   The patient's ADDITIONAL RISK FACTORS and lack of PROTECTIVE FACTORS may increase their overall suicide risk ratings.     Patient's/client's current risk rating:  Low Risk    Safety Plan on File  No risk identified    Family Involvement:   none schedule this week    Data:   client did participate      Intervention:   Education  Group feedback      Assessment:   Stages of Change Model  Maintenance    Appears/Sounds:  Engaged      Plan:  Focus on recovery environment      Jeni BRIZUELA, CGC

## 2023-03-15 NOTE — ADDENDUM NOTE
Encounter addended by: Jeni Schmitz [facility] on: 3/15/2023 11:45 AM   Actions taken: Clinical Note Signed

## 2023-03-15 NOTE — PROGRESS NOTES
"Group Therapy Documentation     Was the patient seen in-person or via Telemedicine (if in-person skip to Group Note): Telemedicine    If Telemedicine: The patient's condition can be safely assessed and treated via synchronous audio and visual telemedicine encounter. ? ?      Reason for Telemedicine Visit: Services only offered telehealth    Originating Site (Patient Location): Patient's home    Distant Site (Provider Location): Provider Remote Setting- Home Office    Consent: ?The patient/guardian has verbally consented to: the potential risks and benefits of telemedicine (video visit) versus in person care; bill my insurance or make self-payment for services provided; and responsibility for payment of non-covered services.       Mode of Communication:??Video Conference via Zoom      As the provider I attest to compliance with applicable laws and regulations related to telemedicine.      Counselor verified Patient with 2-point verification: Patient is known to provider      Patient attended a video group session on the following days: ?          GROUP NOTE:  DATE OF SERVICE:  March 14, 2023    START TIME:  5:30pm    END TIME:  7:16pm    Group Length:   106 minutes    FACILITATOR(S):    Jeni BRIZUELA [facility]     TOPIC: BEH Group Therapy, Problem Gambling Group     Number of patients attending the group: 8    Group Therapy Type:  Problem Gambling Group    Group Attendance:  Client attended group     Summary of Group / Topics Discussed:   Group started with each patient sharing for check in and identifying two feelings. Writer shared with group the \"Encourage me text\". To have another way to get support in recovery. Group went over chapter 28 in the No-Dice workbook - Support. Discussion was held on Gamblers Anonymous. Writer shared a short introductory video to group on SMART Recovery. Also went through website and one lesson in the SMART Recovery workbook for dimension 5. Sharing with group an alternative to Gamblers " "Anonymous. Group ended with a Positive Thoughts reading.      Patient's response to the group topic/interactions:   Patient appeared to gain a better understanding of support groups GA and SMART Recovery.       Client specific details:    Patient was having difficulty with Zoom video so was only on audio for group session. Patient shared with group he purchased the audio book \"The 5 Love Languges\". Patient recommended this to group members, patient shared he gained more knowledge about these techniques. Patient shared he and wife had another marriage counseling session last week but now wife wants to find a different counselor. Patient shared with group he would be interested in attending a SMART Recovery meeting.       Jeni Schmitz LADC, [facility] [place]   "

## 2023-03-21 ENCOUNTER — HOSPITAL ENCOUNTER (OUTPATIENT)
Dept: BEHAVIORAL HEALTH | Facility: CLINIC | Age: 33
Discharge: HOME | End: 2023-03-21
Attending: FAMILY MEDICINE
Payer: COMMERCIAL

## 2023-03-21 PROCEDURE — 90853 GROUP PSYCHOTHERAPY: CPT | Mod: GT,95

## 2023-03-21 NOTE — PROGRESS NOTES
** This is an excused absence**    Per message from patient he is not able to attend group due to family illness.

## 2023-03-22 NOTE — PROGRESS NOTES
* * patient was able to make it to group**    Group Therapy Documentation     Was the patient seen in-person or via Telemedicine (if in-person skip to Group Note): Telemedicine    If Telemedicine: The patient's condition can be safely assessed and treated via synchronous audio and visual telemedicine encounter. ? ?      Reason for Telemedicine Visit: Services only offered telehealth    Originating Site (Patient Location): Patient's home    Distant Site (Provider Location): Provider Remote Setting- Home Office    Consent: ?The patient/guardian has verbally consented to: the potential risks and benefits of telemedicine (video visit) versus in person care; bill my insurance or make self-payment for services provided; and responsibility for payment of non-covered services.       Mode of Communication:??Video Conference via Zoom      As the provider I attest to compliance with applicable laws and regulations related to telemedicine.      Counselor verified Patient with 2-point verification: Patient is known to provider      Patient attended a video group session on the following days: ?          GROUP NOTE:  DATE OF SERVICE:  March 21, 2023    START TIME:  5:30pm    END TIME:  6:42pm    Group Length:   72 minutes    FACILITATOR(S):   Jein BRIZUELA [facility]     TOPIC: BEH Group Therapy, Problem Gambling Group     Number of patients attending the group: 8    Group Therapy Type:  Problem Gambling Group    Group Attendance:  Client attended group     Summary of Group / Topics Discussed:   Group started with each patient taking turns for check in. Discussion on new question asked at check in: Any thoughts of self harm or suicide. Also more information on how to reach out for help and suicide rates with compulsive gambling for dimension 3.Then main topic of cross addiction for dimension 5. Group ended with a Positive Thoughts reading.     Topic: Cross Addiction for Dimension 5    This topic will give a general overview of  addiction and cross addiction     Objective(s):     Client will gain insight into having a drug or alcohol dependence while having a gambling problem.     Client will gain insight into the possibility of starting a new dependence after quitting gambling.     Client will gain insight into cross addiction and the importance of balancing all aspects of their lives.      Structure:     Utilizing No-Dice Book Chapter 27 Cross - Addiction    Review DSM-V criteria for substance use disorder and Gambling Disorder     Facilitate group discussion around each patient s experience of DSM-V criteria    Asking clients to identify any possible behavior addictions/cross addictions they see in their life.      Expected therapeutic outcomes:      Understanding the concept of trading one substance/behavior for another.     Helping to decrease risk of addiction to another substances/behavior     Gain insight into the importance of balancing all areas of our lives.      Therapeutic outcome(s) measured by:     Patient s ability to teach-back information learned in group.     Patient s ability to identify which criteria they meet in relation to their gambling diagnosis(s)       Patient's response to the group topic/interactions:   Patient appeared to gain a better understanding of suicide rates with compulsive gambling, ways to reach out for help and about cross addictions.       Client specific details:     Patient shared that yesterday when he sent writer message he would not be able to attend group, he was wrong and was able to attend today. Patient shared he and wife went to a different marriage counselor last week and found they prefer this new therapist. Patient shared sadness of mother in hospital and unable to visit in person due to family illness. Patient mentioned she should be discharging soon. Patient discussed having his taxes done this week and feeling apprehensive to see the money lost from gambling. Patient had to leave  group early, leaving at 6:42pm    Jeni BRIZUELA,CGC

## 2023-03-22 NOTE — ADDENDUM NOTE
Encounter addended by: Jeni Schmitz [facility] on: 3/22/2023 8:53 AM   Actions taken: Clinical Note Signed

## 2023-03-22 NOTE — PROGRESS NOTES
"Patient:  Anastacio Middleton                         Date of Assessment: 10/26/2022            Problem Gambling Progress Note and Treatment Plan Review     Attendance  Group/Individual: Phase III      Groups Attended: 03/21/2023    Support group attended this week: no    Reporting sobriety:  yes    Client Treatment Goals:   1. Rebuilding family relationships   2. Learning coping skills so not to relapse   3. Feeling better emotionally from what s happened, feeling more like myself\"         Treatment Plan     Treatment Plan Review competed on: March 22, 2023    Staff Members contributing:  Clotilde Fox, [facility], [facility] [place] [facility] & Jeni Schmitz, CHATA, [facility] [place]                         Received Supervision:  yes    Client: contributed to goals and plan.  Client received copy of plan/revised plan: Yes  Client agrees with plan/revised plan: Yes    Changes to Treatment Plan: No  New Goals added since last review:  No additional goals added at this time.    Goals worked on since last review:  Dimension 1 Patient reported no gambling.   Dimension 2 No reported changes in health.   Dimension 3 Patient attended group with topic and discussion on Suicide. Where to find help and suicide rates with compulsive gambling. Patient reported meeting with new marriage counselor this past week.   Dimension 4 Patient continues to attend group session weekly.  Dimension 5 Patient attended group and added to discussino on Cross Addiction.   Dimension 6 Patient reports helping more with children and housework this week due to wife being ill.     Strategies effective: yes    Strategies need these changes: Continue to build supports in the community, find a home GA meeting or SMART Recovery meeting to attend.        Depression and Anxiety at Intake:   Patient's PHQ-9 was 6 out of 27, indicating mild depression.  Patient's LANA-7 score was 9 out of 21, indicating mild anxiety.     Intake Dimension Ratings:    Dimension 1  0    Dimension 2  1    Dimension 3  1    " Dimension 4  0    Dimension 5  2    Dimension 6  1      Guide to Risk Ratings for Suicidality:   IDEATION: Active thoughts of suicide? INTENT: Intent to follow on suicide? PLAN: Plan to follow through on suicide? Level of Risk:   IF Yes Yes Yes Patient = High Emergent   IF Yes Yes No Patient = High Urgent/Non-Emergent   IF Yes No No Patient = Moderate Non-Urgent   IF No No   No Patient = Low Risk   The patient's ADDITIONAL RISK FACTORS and lack of PROTECTIVE FACTORS may increase their overall suicide risk ratings.     Patient's/client's current risk rating:  Low Risk    Per patient no thoughts of self harm or suicide.     Safety Plan on File      Family Involvement:   none schedule this week    Data:   client did participate      Intervention:   Education      Assessment:   Stages of Change Model  Maintenance    Appears/Sounds:  Engaged      Plan:  Focus on recovery environment    Jeni BRIZUELA, CGC

## 2023-03-28 ENCOUNTER — HOSPITAL ENCOUNTER (OUTPATIENT)
Dept: BEHAVIORAL HEALTH | Facility: CLINIC | Age: 33
Discharge: HOME | End: 2023-03-28
Attending: FAMILY MEDICINE
Payer: COMMERCIAL

## 2023-03-28 PROCEDURE — 90853 GROUP PSYCHOTHERAPY: CPT | Mod: GT,95

## 2023-03-29 NOTE — PROGRESS NOTES
Group Therapy Documentation     Was the patient seen in-person or via Telemedicine (if in-person skip to Group Note): Telemedicine    If Telemedicine: The patient's condition can be safely assessed and treated via synchronous audio and visual telemedicine encounter. ? ?      Reason for Telemedicine Visit: Services only offered telehealth    Originating Site (Patient Location): Patient's home    Distant Site (Provider Location): Provider Remote Setting- Home Office    Consent: ?The patient/guardian has verbally consented to the potential risks and benefits of telemedicine (video visit) versus in person care; bill my insurance or make self-payment for services provided; and responsibility for payment of non-covered services.       Mode of Communication:??Video Conference via Zoom      As the provider I attest to compliance with applicable laws and regulations related to telemedicine.      Counselor verified Patient with 2-point verification: Patient is known to provider      Patient attended a video group session on the following days: ?          GROUP NOTE:  DATE OF SERVICE:  March 28, 2023    START TIME:  5:30pm    END TIME:  7:35pm    Group Length:   125 Minutes    FACILITATOR(S):    Jeni BRIZUELA     TOPIC: BEH Group Therapy, Problem Gambling Group     Number of patients attending the group: 11    Group Therapy Type:  Problem Gambling Group    Group Attendance:  Client attended group     Summary of Group / Topics Discussed:   Group started with introduction of new group members. Everyone took turns introducing themselves and welcoming the new members. Discussion was continued from last week on Cognitive Biases. Information given from writer to group on causes of bias, impact of Cognitive Bias and tips for overcoming bias. Writer introduced to group an online gamers anonymous website and different Zoom options if they would like to attend or share with someone else. Group took turns reading a handout on Talking  with Children about Gambling. Information from the Division on Addiction at [facility], a teaching affiliate of [employer]. Discussion was held on group members childhood connections with gambling and talking to their children/younger family members about gambling for Dimension 6. Group ended with a Positive Thoughts reading.      Patient's response to the group topic/interactions:    Patient appeared to gain a better understanding of Cognitive Biases, marilou anonymous meetings and how to talk to children about gambling.       Client specific details:     Patient shared this previous week he had been taking care of sick wife and doing all household chores and talking care of his two sons. Patient shared he is feeling less tired not that wife is feeling better. Patient shared he had a game night with friends over this past weekends. Patient mentioned he has a full session with his mental health therapist today. Patient shared he will not be in group next week due to family vacation.       Jeni BRIZUELA, [facility]

## 2023-03-29 NOTE — PROGRESS NOTES
"Patient:  Anastacio Middleton                         Date of Assessment: 10/26/2022               Problem Gambling Progress Note and Treatment Plan Review     Attendance  Group/Individual: Phase III       Groups Attended: 03/28/2023    Support group attended this week: no    Reporting sobriety:  yes    Client Treatment Goals:   1. Rebuilding family relationships   2. Learning coping skills so not to relapse   3. Feeling better emotionally from what s happened, feeling more like myself\"         Treatment Plan     Treatment Plan Review competed on: March 29, 2023    Staff Members contributing:  Clotilde Fox, [facility], [facility] & Jeni Schmitz, CHATA, [facility]                         Received Supervision:  yes    Client: contributed to goals and plan.  Client received copy of plan/revised plan: Yes  Client agrees with plan/revised plan: Yes    Changes to Treatment Plan: No  New Goals added since last review:  No additional goals added at this time.    Goals worked on since last review:  Dimension 1 Patient reported no gambling.   Dimension 2 No reported changes in health.   Dimension 3 Patient continues to work on emotion identification. Patient reports continued session with mental health therapist.   Dimension 4 Patient continues to attend group session each week.   Dimension 5 Patient continues to work on treatment plan goals.   Dimension 6 Attended group and added to discussion on: Talking with Children about Gambling. Patient mentioned spending time with friends for a game night.     Strategies effective: yes    Strategies need these changes:   Continue to build supports in the community, find a home GA meeting or SMART Recovery meeting to attend.        Depression and Anxiety at Intake:   Patient's PHQ-9 was 6 out of 27, indicating mild depression.  Patient's LANA-7 score was 9 out of 21, indicating mild anxiety.        Intake Dimension Ratings:    Dimension 1  0    Dimension 2  1    Dimension 3  1    Dimension 4  0    Dimension " 5  2    Dimension 6  1      Guide to Risk Ratings for Suicidality:   IDEATION: Active thoughts of suicide? INTENT: Intent to follow on suicide? PLAN: Plan to follow through on suicide? Level of Risk:   IF Yes Yes Yes Patient = High Emergent   IF Yes Yes No Patient = High Urgent/Non-Emergent   IF Yes No No Patient = Moderate Non-Urgent   IF No No   No Patient = Low Risk   The patient's ADDITIONAL RISK FACTORS and lack of PROTECTIVE FACTORS may increase their overall suicide risk ratings.     Patient's/client's current risk rating:  Low Risk  Patient reports no thoughts of self-harm or suicide.   Safety Plan on File      Family Involvement:   none schedule this week    Data:   client did participate      Intervention:   Education  Group feedback      Assessment:   Stages of Change Model  Maintenance    Appears/Sounds:  Cooperative      Plan:  Focus on recovery environment      Jeni BRIZUELA, [facility]

## 2023-03-29 NOTE — ADDENDUM NOTE
Encounter addended by: Jeni Schmitz [facility] on: 3/29/2023 8:43 AM   Actions taken: Clinical Note Signed

## 2023-04-11 ENCOUNTER — HOSPITAL ENCOUNTER (OUTPATIENT)
Dept: BEHAVIORAL HEALTH | Facility: CLINIC | Age: 33
Discharge: HOME | End: 2023-04-11
Attending: FAMILY MEDICINE
Payer: COMMERCIAL

## 2023-04-11 PROCEDURE — 90853 GROUP PSYCHOTHERAPY: CPT | Mod: GT,95

## 2023-04-12 ENCOUNTER — HOSPITAL ENCOUNTER (OUTPATIENT)
Dept: BEHAVIORAL HEALTH | Facility: CLINIC | Age: 33
Discharge: HOME | End: 2023-04-12
Attending: FAMILY MEDICINE
Payer: COMMERCIAL

## 2023-04-12 PROCEDURE — 90832 PSYTX W PT 30 MINUTES: CPT | Mod: GT,95

## 2023-04-12 ASSESSMENT — ANXIETY QUESTIONNAIRES
1. FEELING NERVOUS, ANXIOUS, OR ON EDGE: NOT AT ALL
5. BEING SO RESTLESS THAT IT IS HARD TO SIT STILL: NOT AT ALL
GAD7 TOTAL SCORE: 1
3. WORRYING TOO MUCH ABOUT DIFFERENT THINGS: NOT AT ALL
4. TROUBLE RELAXING: SEVERAL DAYS
6. BECOMING EASILY ANNOYED OR IRRITABLE: NOT AT ALL
2. NOT BEING ABLE TO STOP OR CONTROL WORRYING: NOT AT ALL
7. FEELING AFRAID AS IF SOMETHING AWFUL MIGHT HAPPEN: NOT AT ALL
GAD7 TOTAL SCORE: 1

## 2023-04-12 NOTE — PROGRESS NOTES
Group Therapy Documentation     Was the patient seen in-person or via Telemedicine (if in-person skip to Group Note): Telemedicine    If Telemedicine: The patient's condition can be safely assessed and treated via synchronous audio and visual telemedicine encounter. ? ?      Reason for Telemedicine Visit: Services only offered telehealth    Originating Site (Patient Location): Patient's home    Distant Site (Provider Location): Provider Remote Setting- Home Office    Consent: ?The patient/guardian has verbally consented to: the potential risks and benefits of telemedicine (video visit) versus in person care; bill my insurance or make self-payment for services provided; and responsibility for payment of non-covered services.       Mode of Communication:??Video Conference via Zoom      As the provider I attest to compliance with applicable laws and regulations related to telemedicine.      Counselor verified Patient with 2-point verification: Patient is known to provider      Patient attended a video group session on the following days: ?          GROUP NOTE:  DATE OF SERVICE:  April 11, 2023    START TIME:  5:30pm    END TIME:  7:20pm    Group Length:   110 minutes    FACILITATOR(S):    CHATA Curry, [facility]      TOPIC: BEH Group Therapy, Problem Gambling Group     Number of patients attending the group: 7    Group Therapy Type:  Problem Gambling Group    Group Attendance:  Client attended group     Summary of Group / Topics Discussed:   Group started with each group member taking turns for check in and identifying two feelings. Discussion was continued from last Thursday on Money and Finances, information from the No-Dice book was shared and group members took turns with the readings for Dimension 5. Writer and group discussed ways to set up a monthly budget, ways to add to budget and ways to find extra money in budget to pay off gambling debts. Discussion also included the topics of the best ways to contact  "creditors and what where the emotions identified when first setting up budgets with spouse/partners. Group ended with a Positive Thoughts reading.     Patient's response to the group topic/interactions:    Patient appeared to gain a better understanding of setting up household budget and finding new ways to add to budget to pay off gambling debts.       Client specific details:    Patient shared with group what he did on vacation last week, going to [place] to spend time with father in law and spend time where wife grew up. Patient shared before going on vacation he and wife had another session with their couples counselor and \"it went really good\". Patient shared he and wife are listening to an audio book for an assignment with counselor. Patient shared he has his taxes done. Also sharing he had a session with his mental health therapist today and that \"went good\". Patient shared he \"lost confidence\" with himself while gambling when he was losing money. Patient mentioned how his wife has shared with him, she is still nervous he could fall into gambling again. Writer scheduled an individual session with patient for tomorrow at 2pm.       CHATA Curry, [facility]    "

## 2023-04-12 NOTE — PROGRESS NOTES
Individual counseling session:    Telemedicine Visit: The patient's condition can be safely assessed and treated via synchronous audio and visual telemedicine encounter.      Reason for Telemedicine Visit: Services only offered telehealth    Originating Site (Patient Location): Patient's home    Distant Site (Provider Location): Provider Remote Setting- Home Office    Consent:  The patient/guardian has verbally consented to: the potential risks and benefits of telemedicine (video visit) versus in person care; bill my insurance or make self-payment for services provided; and responsibility for payment of non-covered services.     Mode of Communication:  Video Conference via [employer]    As the provider I attest to compliance with applicable laws and regulations related to telemedicine.    Counselor verified Patient with 2-point verification: Patient is known to provider.    Video visit start time: 2:00pm  Video visit end time: 2:28pm    Length of session: 28 minutes    D: Writer and patient met for a scheduled video individual session via [employer]. Writer administered the LANA-7, PHQ-2 and PROMIS questionnaires. Writer and patient updated treatment plan. Patient reported continued sessions with mental health therapist on Tuesdays as well as couples counseling sessions on Fridays. Patient shared he is looking into joining fellow group members at virtual GA meetings. Patient shared he continues to show wife gratitude, connecting everyday and talk about his feelings. Writer and patient agreed to have patient complete Continuing Care Plan for a Successful Long-Term Recovery and to share in group on Tuesday. Patient will officially graduate program on Tuesday, patient's birthday.   I: Counselor facilitated 1:1 session.  A: Patient appeared grateful for graduation.   P: Writer will email Continuing Care Plan and Planning for Aftercare for patient to complete. Writer has requested a graduation coin be mailed to patient.  Patient will attend group session on Tuesday for graduation.       CHATA Curry, [facility]

## 2023-04-13 NOTE — PROGRESS NOTES
"Patient:  Anastacio Middleton                         Date of Assessment: 10/26/2022            Problem Gambling Progress Note and Treatment Plan Review     Attendance  Group/Individual: Phase III     Groups Attended:  04/11/2023 and individual session on 04/12/2023      Support group attended this week: no    Reporting sobriety:  yes    Client Treatment Goals:   1. Rebuilding family relationships   2. Learning coping skills so not to relapse   3. Feeling better emotionally from what s happened, feeling more like myself\"         Treatment Plan     Treatment Plan Review competed on: April 13, 2023    Staff Members contributing:  Clotilde Fox, [facility], [facility], [facility] & CHATA Curry, [facility], [facility]                         Received Supervision:  yes    Client: contributed to goals and plan.  Client received copy of plan/revised plan: Yes  Client agrees with plan/revised plan: Yes    Changes to Treatment Plan: No  New Goals added since last review:  No additional goals added at this time.    Goals worked on since last review:  Dimension 1 Patient reported no gambling.  Dimension 2 No reported changes in health.   Dimension 3 Patient reported continued sessions with individual mental health therapist and with couples counselor.   Dimension 4 Patient will be graduating from programming next Tuesday 04/18/2023.  Dimension 5 Patient continues to attend group session weekly.   Dimension 6 Patient was not in group session last week due to family vacation. Patient shared having a nice time with family and extended family. Patient reported looking to join GA meetings online.     Strategies effective: yes    Strategies need these changes:   Continue to build supports in the community, find a home GA meeting or SMART Recovery meeting to attend.        Depression and Anxiety at Intake:   Patient's PHQ-9 was 6 out of 27, indicating mild depression.  Patient's LANA-7 score was 9 out of 21, indicating mild anxiety.      Intake Dimension Ratings:    " Dimension 1  0    Dimension 2  1    Dimension 3  1    Dimension 4  0    Dimension 5  2    Dimension 6  1      Guide to Risk Ratings for Suicidality:   IDEATION: Active thoughts of suicide? INTENT: Intent to follow on suicide? PLAN: Plan to follow through on suicide? Level of Risk:   IF Yes Yes Yes Patient = High Emergent   IF Yes Yes No Patient = High Urgent/Non-Emergent   IF Yes No No Patient = Moderate Non-Urgent   IF No No   No Patient = Low Risk   The patient's ADDITIONAL RISK FACTORS and lack of PROTECTIVE FACTORS may increase their overall suicide risk ratings.     Patient's/client's current risk rating:  Low Risk  Patient reported no thoughts of self-harm or suicide.   Safety Plan on File      Family Involvement:   none schedule this week    Data:   client did participate      Intervention:   Education      Assessment:   Stages of Change Model  Maintenance    Appears/Sounds:  Engaged      Plan:  Focus on recovery environment      CHATA Curry, [facility]

## 2023-04-13 NOTE — ADDENDUM NOTE
Encounter addended by: Jeni Schmitz [facility] on: 4/13/2023 1:45 PM   Actions taken: Clinical Note Signed

## 2023-04-18 ENCOUNTER — HOSPITAL ENCOUNTER (OUTPATIENT)
Dept: BEHAVIORAL HEALTH | Facility: CLINIC | Age: 33
Discharge: HOME | End: 2023-04-18
Attending: FAMILY MEDICINE
Payer: COMMERCIAL

## 2023-04-18 PROCEDURE — 90853 GROUP PSYCHOTHERAPY: CPT | Mod: GT,95

## 2023-04-19 NOTE — PROGRESS NOTES
Group Therapy Documentation     Was the patient seen in-person or via Telemedicine (if in-person skip to Group Note): Telemedicine    If Telemedicine: The patient's condition can be safely assessed and treated via synchronous audio and visual telemedicine encounter. ? ?      Reason for Telemedicine Visit: Services only offered telehealth    Originating Site (Patient Location): Patient's home    Distant Site (Provider Location): Provider Remote Setting- Home Office    Consent: ?The patient/guardian has verbally consented to the potential risks and benefits of telemedicine (video visit) versus in person care; bill my insurance or make self-payment for services provided; and responsibility for payment of non-covered services.       Mode of Communication:??Video Conference via Zoom      As the provider I attest to compliance with applicable laws and regulations related to telemedicine.      Counselor verified Patient with 2-point verification: Patient is known to provider      Patient attended a video group session on the following days: ?          GROUP NOTE:  DATE OF SERVICE:  April 18, 2023    START TIME:  5:30pm    END TIME:  6:15pm    Group Length:   45 Minutes    FACILITATOR(S):    CHATA Curry, [facility]      TOPIC: BEH Group Therapy, Problem Gambling Group     Number of patients attending the group: 7    Group Therapy Type:  Problem Gambling Group    Group Attendance:  Client attended group     Summary of Group / Topics Discussed:   Group started with each group member taking turns for check in and identifying two feelings. Group participated in a graduation ceremony for fellow group member. Everyone including writer took turns with words of encouragement and congratulations to graduate. Graduate took some time to speak to group about his recovery, his graduation and shared his completed Continuing Care Plan for a Successful Long-Term Recovery worksheet. Graduate then left group after ceremony. Group continued  "with discussion of [employer] a website where people in recovery in gambling can share stories and chat. Writer shared with group the GA 20 questions and discussion was held on patient s previous experience with GA and the 20 questions if any. Discussion was held on the topic of Leisure for Dimension 6 from the No-Dice Book. Patients identified activities they did for leisure before gambling, what are some barriers and what are things they can do now for leisure activities. Group ended with a Positive Thoughts reading.      Patient's response to the group topic/interactions:    Patient appeared to gain a better understanding of ways to add leisure to their lives during recovery.       Client specific details:     Patient graduated from programming today. Patient shared with group his \"nervousness\" on taking the next steps in recovery. Patient shared his gratitude to writer and group members for their support and kind words for graduation. Patient share he continues to have his individual sessions with a mental health therapist as well as couples counseling. Patient mentioned wife is \"excited for patient on his graduation\". Patient shared with group the care plan he completed. Patient left group after graduation ceremony at 6:15pm.      CHATA Curry, [facility]    "

## 2023-04-20 NOTE — ADDENDUM NOTE
Encounter addended by: Jeni Schmitz [facility] on: 4/20/2023 2:06 PM   Actions taken: Clinical Note Signed

## 2023-04-20 NOTE — DISCHARGE SUMMARY
PROBLEM GAMBLING DISCHARGE SUMMARY    PATIENT NAME:  Anastacio Middleton  MRN #: [id number]  :  1990    TREATMENT COUNSELING TEAM: CHATA Perkins, [facility] & CHATA Curry, [facility] [place]    PROGRAM:  [facility]    EVALUATION DATE: 10/26/2022  DATE OF LAST SESSION: 2023  DISCHARGE DATE: 2023    DIAGNOSIS:  Compulsive Gambling 312.21    HOURS OF TREATMENT COMPLETED:  Patient completed Phase III of programming at time of graduation/discharge. Patient participated in both group and individual sessions.     DISCHARGE STATUS: With Staff Approval. Patient completed [facility] Compulsive Gambling outpatient program.     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 Patient reported lying to wife and hiding evidence he has been trading stocks. Patient reported sharing with wife a week before assessment he had been day trading.      SERVICES PROVIDED:  Our services included assessment, treatment planning and education regarding addiction, relationships, and relapse prevention. The patient also participated in group therapy, individual therapy and aftercare planning.    ISSUES ADDRESS IN TREATMENT:    DIMENSION 1 - ACUTE INTOXICATION/WITHDRAWAL POTENTIAL  ADMISSION RISK RATIN  DISCHARGE RISK RATIN  Upon admission patient reported no substance related issues. Patient did report smoking 1/2 a pack of cigarettes per day. Patient reported no chemical dependency withdrawal symptoms and patient identified multiple gambling withdrawal symptoms. During programming patient reported quitting smoking.  At time of discharge patient reported no gambling withdrawal symptoms and no chemical dependency withdrawal symptoms.     DIMENSION 2 - BIOMEDICAL COMPLICATIONS AND CONDITIONS  ADMISSION RISK RATIN  DISCHARGE RISK RATIN  Upon admission patient reported  "long term chronic pain in left hand due to injury in . Patient reported losing two fingers on left hand and two finger partially. Patient reported talking over the counter medications most days of the week for the pain. During program patient was introduced to topics: PAWS (Post-Acute Withdrawal Syndrome) along with topics on self-care. During programming and at discharge patient reported no changed in health and reported the ability to navigate the healthcare system as needed.     DIMENSION 3 - EMOTIONAL, BEHAVIORAL, COGNITIVE CONDITIONS AND COMPLICATIONS  ADMISSION RISK RATIN  DISCHARGE RISK RATIN  Upon admission patient reported having passing thoughts of harming others. Patient reported no need to address at this time. Patient advised if these happen again to reach out for support. During time in program and at discharge patient did not disclose any more of these thoughts. At time of admission patient completed Safety Plan. At time of admission patient reported not having a mental health therapist. During time in program patient attained a mental health therapist and stared marriage counseling. During programming patient worked on topics of healthy communication, stress management, emotion identification/regulation and other topics of emotional behavior conditions. At time of admission, during programming and at discharge patient reported no thoughts of self-harm or suicide. At discharge patient reported he will continue with his mental health therapist the couples counseling. At discharge patient completed a Continuing Care Plan for a Successful Long Term Recovery form.     DIMENSION 4 - READINESS FOR CHANGE  ADMISSION RISK RATIN  DISCHARGE RISK RATIN  At time of admission patient reported gambling \"strained and hurt relationship with wife\". Also \"significantly hurt finances, loss of substantial amount of money\". Patient reported being \"pretty motivated\" to stop gambling. During programming " "patient shared understanding of how gambling had affected him, his wife and family negatively. During programming patient worked on topics of motivation, values, setting goals, stages of change and character strengths. As well as others on the topic of change. During programming patient was consistent with this attendance with group and individual sessions. At time of discharge patient reported he would continue with self-improvement; reading books and listening to podcasts.      DIMENSION 5 - RELAPSE, CONTINUED USE AND CONTINUED PROBLEM POTENTIAL   ADMISSION RISK RATIN  DISCHARGE RISK RATIN  At time of admission patient identified triggers to steele as \"seeing big swings in the stock market and having alone time\". Patient reported ways to help reduce his urges to steele as \"talking about the problem, not hiding it anymore\". During programming patient worked on topics of: the emotional meaning of money, triggers, relapse, cross addictions and others. During programming patient shared he and wife gained a financial counselor to assist with household budget and future financial planning. At time of discharge patient identified coping skills, leisure activities and people he can reach out to when having gambling urges.     DIMENSION 6 - RECOVERY ENVIRONMENT  ADMISSION RISK RATIN  DISCHARGE RISK RATIN  At time of admission patient reported living with wife of 13 years and their two children. 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 Patient reported spending time outside of work with wife and children. Also reading books, watching movies and spending time with family and friends on the weekends. Patient reported attending [employer] a few times a month and having daily prayer time. During programming patient worked on topics of: finding balance in life, altruism, relationships and attending groups with speakers from [place]. At discharge patient identified " "things that would support his recovery. Patient identified: continuing with mental health therapist, staying active, continue to attend [employer], continuing to be more open and vulnerable, continuing to work full time and enjoying time with family. Patient also reported starting to attend GA meetings.     STRENGTHS: At time of assessment patient reported his greatest personal strengths as \"being kind, care giving and a hard worker\". Patient also displayed a positive attitude, progress, participation, and motivation. Patient appears to be receptive to accepting long term abstinence and the need for aftercare. Patient reported at time of discharge he will be attending [place] meetings for added support in recovery. As well as continued sessions with mental health therapist.     PROGNOSIS:  Prognosis for this patient is a Favorable Discharge - Patients has completed agreed upon treatment goals, understands their diagnosis, and appears motivated about follow-up care as needed.    CONTINUING CARE RECOMMENDATIONS:    Abstain from all forms of gambling, including \"free\" games and online/héctor games.    Refrain from entering all types of marilou establishments.    Limit, if not abstain from alcohol and all mood-altering substances unless prescribed by a licensed medical provider.    Continue to implement established financial safeguards set with the help of trusted others.    Attend Gamblers Anonymous (GA) 12-step, cultural, spiritual, and/or other supportive community meetings on a weekly basis.    Check in weekly with a trusted individual who will hold you accountable.       CHATA Curry, [facility]          "

## 2024-02-11 ENCOUNTER — HEALTH MAINTENANCE LETTER (OUTPATIENT)
Age: 34
End: 2024-02-11

## 2025-03-08 ENCOUNTER — HEALTH MAINTENANCE LETTER (OUTPATIENT)
Age: 35
End: 2025-03-08